# Patient Record
Sex: MALE | Race: WHITE | Employment: FULL TIME | URBAN - METROPOLITAN AREA
[De-identification: names, ages, dates, MRNs, and addresses within clinical notes are randomized per-mention and may not be internally consistent; named-entity substitution may affect disease eponyms.]

---

## 2017-01-26 ENCOUNTER — TELEPHONE (OUTPATIENT)
Dept: PAIN CLINIC | Facility: HOSPITAL | Age: 57
End: 2017-01-26

## 2017-03-23 RX ORDER — HYDROCODONE BITARTRATE AND ACETAMINOPHEN 10; 325 MG/1; MG/1
1 TABLET ORAL EVERY 6 HOURS PRN
Qty: 120 TABLET | Refills: 0 | Status: SHIPPED | OUTPATIENT
Start: 2017-03-23 | End: 2018-01-02

## 2017-07-20 ENCOUNTER — OFFICE VISIT (OUTPATIENT)
Dept: SURGERY | Facility: CLINIC | Age: 57
End: 2017-07-20

## 2017-07-20 DIAGNOSIS — D22.5 MELANOCYTIC NEVI OF TRUNK: Primary | ICD-10-CM

## 2017-07-20 RX ORDER — OLOPATADINE HCL 0.2 %
DROPS OPHTHALMIC (EYE)
COMMUNITY
Start: 2017-06-20 | End: 2020-12-30 | Stop reason: ALTCHOICE

## 2017-07-20 RX ORDER — KETOCONAZOLE 20 MG/G
CREAM TOPICAL
COMMUNITY
Start: 2017-07-18 | End: 2020-12-30

## 2017-07-20 NOTE — H&P
Ian Broderick. is a 64year old male that presents with Patient presents with:  Lesion: Multiple lesions R lower back, L chest superior and inferior, L medial chest, L posterior neck, back of head and L thigh  . REFERRED BY:  No ref.  provider found Back of head  Onset: many years  No pain  No itching  No bleeding  No infection  No size change and no color change.     Lesion 7   Location: L thigh  Onset: many years  No Pain  No itching  No bleeding  No infection   No size change or color change    No b Normal  HEENT: Normocephalic  EYES: Conjunctiva - Right: Normal, Left: Normal; EOMI  EARS: Inspection - Right: Normal, Left: Normal  NECK/THYROID: Inspection - Normal, Palpation - Normal, Thyroid gland - Normal, No adenopathy  RESPIRATORY: Inspection - Nor

## 2017-08-15 PROBLEM — S76.309A HAMSTRING INJURY: Status: ACTIVE | Noted: 2017-08-15

## 2017-09-11 ENCOUNTER — OFFICE VISIT (OUTPATIENT)
Dept: PAIN CLINIC | Facility: HOSPITAL | Age: 57
End: 2017-09-11
Attending: ANESTHESIOLOGY
Payer: COMMERCIAL

## 2017-09-11 VITALS
WEIGHT: 220 LBS | DIASTOLIC BLOOD PRESSURE: 95 MMHG | HEIGHT: 73 IN | BODY MASS INDEX: 29.16 KG/M2 | SYSTOLIC BLOOD PRESSURE: 145 MMHG | HEART RATE: 66 BPM

## 2017-09-11 DIAGNOSIS — S76.301D RIGHT HAMSTRING INJURY, SUBSEQUENT ENCOUNTER: Primary | ICD-10-CM

## 2017-09-11 DIAGNOSIS — M54.2 CERVICALGIA OF OCCIPITO-ATLANTO-AXIAL REGION: ICD-10-CM

## 2017-09-11 PROCEDURE — 99212 OFFICE O/P EST SF 10 MIN: CPT

## 2017-09-11 PROCEDURE — 20552 NJX 1/MLT TRIGGER POINT 1/2: CPT

## 2017-09-11 PROCEDURE — 20553 NJX 1/MLT TRIGGER POINTS 3/>: CPT

## 2017-09-11 RX ORDER — HYDROCODONE BITARTRATE AND ACETAMINOPHEN 10; 325 MG/1; MG/1
1 TABLET ORAL EVERY 6 HOURS PRN
Qty: 120 TABLET | Refills: 0 | Status: SHIPPED | OUTPATIENT
Start: 2017-09-11 | End: 2017-10-11

## 2017-09-11 RX ORDER — TRAMADOL HYDROCHLORIDE 50 MG/1
50 TABLET ORAL EVERY 6 HOURS PRN
Qty: 120 TABLET | Refills: 0 | Status: SHIPPED | OUTPATIENT
Start: 2017-09-11 | End: 2017-10-11

## 2017-09-11 RX ORDER — MELOXICAM 7.5 MG/1
7.5 TABLET ORAL DAILY
Qty: 30 TABLET | Refills: 0 | Status: SHIPPED | OUTPATIENT
Start: 2017-09-11 | End: 2017-10-11

## 2017-09-11 NOTE — PROGRESS NOTES
09/11/17  PRESENTS AMBULATORY TO CPM;  PT C/O OF RBP RADICULAR PAIN RLE;  RATES HIS PAIN 5/10;  SEEN BY DR. HERNANDEZ;  CONSENT FOR TPI REVIEWED & SIGNED;  TIME OUT TAKEN;  INJECTION COMPLETED;  PT TOLERATED PROCEDURE WITHOUT DIFFICULTY;  DISCHARGED IN STAB

## 2017-09-11 NOTE — CHRONIC PAIN
HPI:    Patient ID: Nae Gan is a 62year old male.     HPI    Review of Systems    I did some trigger points in Serjio Ortiz about 2 months ago in his right leg pain that decreased considerably he still has issues with a right hamstring weakness and and i clear all station heart rate and rhythm his infection course of motor is equal intact right hamstring is considerably weak compared to the to the left reflexes are patellar and ankle are 2+ and equal paraspinal muscles involving the cervical trapezial musc

## 2017-09-19 ENCOUNTER — TELEPHONE (OUTPATIENT)
Dept: PAIN CLINIC | Facility: HOSPITAL | Age: 57
End: 2017-09-19

## 2017-09-19 DIAGNOSIS — R29.898 WEAKNESS OF LOWER EXTREMITY, UNSPECIFIED LATERALITY: Primary | ICD-10-CM

## 2017-09-20 ENCOUNTER — TELEPHONE (OUTPATIENT)
Dept: PAIN CLINIC | Facility: HOSPITAL | Age: 57
End: 2017-09-20

## 2017-09-20 DIAGNOSIS — M79.604 RIGHT LEG PAIN: Primary | ICD-10-CM

## 2017-11-02 ENCOUNTER — TELEPHONE (OUTPATIENT)
Dept: NEUROLOGY | Facility: CLINIC | Age: 57
End: 2017-11-02

## 2017-11-03 ENCOUNTER — OFFICE VISIT (OUTPATIENT)
Dept: NEUROLOGY | Facility: CLINIC | Age: 57
End: 2017-11-03

## 2017-11-03 DIAGNOSIS — G62.9 NEUROPATHY: Primary | ICD-10-CM

## 2017-11-03 PROCEDURE — 95886 MUSC TEST DONE W/N TEST COMP: CPT | Performed by: OTHER

## 2017-11-03 PROCEDURE — 95908 NRV CNDJ TST 3-4 STUDIES: CPT | Performed by: OTHER

## 2017-12-30 ENCOUNTER — LAB ENCOUNTER (OUTPATIENT)
Dept: LAB | Facility: HOSPITAL | Age: 57
End: 2017-12-30
Attending: INTERNAL MEDICINE
Payer: COMMERCIAL

## 2017-12-30 DIAGNOSIS — Z00.00 EXAMINATION, MEDICAL, GENERAL: Primary | ICD-10-CM

## 2017-12-30 LAB
ALBUMIN SERPL BCP-MCNC: 4 G/DL (ref 3.5–4.8)
ALBUMIN/GLOB SERPL: 1.7 {RATIO} (ref 1–2)
ALP SERPL-CCNC: 56 U/L (ref 32–100)
ALT SERPL-CCNC: 39 U/L (ref 17–63)
ANION GAP SERPL CALC-SCNC: 7 MMOL/L (ref 0–18)
AST SERPL-CCNC: 35 U/L (ref 15–41)
BASOPHILS # BLD: 0.1 K/UL (ref 0–0.2)
BASOPHILS NFR BLD: 1 %
BILIRUB SERPL-MCNC: 0.6 MG/DL (ref 0.3–1.2)
BUN SERPL-MCNC: 11 MG/DL (ref 8–20)
BUN/CREAT SERPL: 7.7 (ref 10–20)
CALCIUM SERPL-MCNC: 9 MG/DL (ref 8.5–10.5)
CHLORIDE SERPL-SCNC: 105 MMOL/L (ref 95–110)
CHOLEST SERPL-MCNC: 206 MG/DL (ref 110–200)
CO2 SERPL-SCNC: 27 MMOL/L (ref 22–32)
CREAT SERPL-MCNC: 1.42 MG/DL (ref 0.5–1.5)
CREAT UR-MCNC: 165.3 MG/DL
EOSINOPHIL # BLD: 0.3 K/UL (ref 0–0.7)
EOSINOPHIL NFR BLD: 5 %
ERYTHROCYTE [DISTWIDTH] IN BLOOD BY AUTOMATED COUNT: 13.6 % (ref 11–15)
GLOBULIN PLAS-MCNC: 2.4 G/DL (ref 2.5–3.7)
GLUCOSE SERPL-MCNC: 104 MG/DL (ref 70–99)
HBA1C MFR BLD: 5.3 % (ref 4–6)
HCT VFR BLD AUTO: 52.4 % (ref 41–52)
HDLC SERPL-MCNC: 31 MG/DL
HGB BLD-MCNC: 17.6 G/DL (ref 13.5–17.5)
LDLC SERPL CALC-MCNC: 126 MG/DL (ref 0–99)
LYMPHOCYTES # BLD: 1.9 K/UL (ref 1–4)
LYMPHOCYTES NFR BLD: 27 %
MCH RBC QN AUTO: 30.7 PG (ref 27–32)
MCHC RBC AUTO-ENTMCNC: 33.5 G/DL (ref 32–37)
MCV RBC AUTO: 91.7 FL (ref 80–100)
MICROALBUMIN UR-MCNC: 0.4 MG/DL (ref 0–1.8)
MICROALBUMIN/CREAT UR: 2.4 MG/G{CREAT} (ref 0–20)
MONOCYTES # BLD: 0.6 K/UL (ref 0–1)
MONOCYTES NFR BLD: 9 %
NEUTROPHILS # BLD AUTO: 4.1 K/UL (ref 1.8–7.7)
NEUTROPHILS NFR BLD: 59 %
NONHDLC SERPL-MCNC: 175 MG/DL
OSMOLALITY UR CALC.SUM OF ELEC: 288 MOSM/KG (ref 275–295)
PLATELET # BLD AUTO: 171 K/UL (ref 140–400)
PMV BLD AUTO: 8.7 FL (ref 7.4–10.3)
POTASSIUM SERPL-SCNC: 4 MMOL/L (ref 3.3–5.1)
PROT SERPL-MCNC: 6.4 G/DL (ref 5.9–8.4)
PSA FREE MFR SERPL: 13 %
PSA FREE SERPL-MCNC: 0.61 NG/ML
PSA SERPL-MCNC: 4.6 NG/ML (ref 0–4)
RBC # BLD AUTO: 5.72 M/UL (ref 4.5–5.9)
SODIUM SERPL-SCNC: 139 MMOL/L (ref 136–144)
TRIGL SERPL-MCNC: 244 MG/DL (ref 1–149)
TSH SERPL-ACNC: 2.62 UIU/ML (ref 0.45–5.33)
WBC # BLD AUTO: 7.1 K/UL (ref 4–11)

## 2017-12-30 PROCEDURE — 86803 HEPATITIS C AB TEST: CPT

## 2017-12-30 PROCEDURE — 80053 COMPREHEN METABOLIC PANEL: CPT

## 2017-12-30 PROCEDURE — 82570 ASSAY OF URINE CREATININE: CPT

## 2017-12-30 PROCEDURE — 85025 COMPLETE CBC W/AUTO DIFF WBC: CPT

## 2017-12-30 PROCEDURE — 84443 ASSAY THYROID STIM HORMONE: CPT

## 2017-12-30 PROCEDURE — 82043 UR ALBUMIN QUANTITATIVE: CPT

## 2017-12-30 PROCEDURE — 82306 VITAMIN D 25 HYDROXY: CPT

## 2017-12-30 PROCEDURE — 84153 ASSAY OF PSA TOTAL: CPT

## 2017-12-30 PROCEDURE — 83036 HEMOGLOBIN GLYCOSYLATED A1C: CPT

## 2017-12-30 PROCEDURE — 84154 ASSAY OF PSA FREE: CPT

## 2017-12-30 PROCEDURE — 80061 LIPID PANEL: CPT

## 2017-12-30 PROCEDURE — 36415 COLL VENOUS BLD VENIPUNCTURE: CPT

## 2018-01-02 ENCOUNTER — TELEPHONE (OUTPATIENT)
Dept: PAIN CLINIC | Facility: HOSPITAL | Age: 58
End: 2018-01-02

## 2018-01-02 LAB — HCV AB SERPL QL IA: NONREACTIVE

## 2018-01-02 RX ORDER — HYDROCODONE BITARTRATE AND ACETAMINOPHEN 10; 325 MG/1; MG/1
1 TABLET ORAL EVERY 8 HOURS PRN
Qty: 90 TABLET | Refills: 0 | Status: SHIPPED | OUTPATIENT
Start: 2018-01-02 | End: 2018-02-21

## 2018-01-04 LAB — 25(OH)D3 SERPL-MCNC: 47.3 NG/ML

## 2018-02-21 ENCOUNTER — TELEPHONE (OUTPATIENT)
Dept: PAIN CLINIC | Facility: HOSPITAL | Age: 58
End: 2018-02-21

## 2018-02-21 RX ORDER — HYDROCODONE BITARTRATE AND ACETAMINOPHEN 10; 325 MG/1; MG/1
1 TABLET ORAL EVERY 8 HOURS PRN
Qty: 90 TABLET | Refills: 0 | Status: SHIPPED | OUTPATIENT
Start: 2018-02-21 | End: 2018-08-17

## 2018-03-29 ENCOUNTER — TELEPHONE (OUTPATIENT)
Dept: NEUROLOGY | Facility: CLINIC | Age: 58
End: 2018-03-29

## 2018-04-03 PROBLEM — I10 ESSENTIAL HYPERTENSION: Status: ACTIVE | Noted: 2018-04-03

## 2018-04-10 ENCOUNTER — LAB ENCOUNTER (OUTPATIENT)
Dept: LAB | Facility: HOSPITAL | Age: 58
End: 2018-04-10
Attending: INTERNAL MEDICINE
Payer: COMMERCIAL

## 2018-04-10 DIAGNOSIS — I10 ESSENTIAL HYPERTENSION: ICD-10-CM

## 2018-04-10 PROCEDURE — 81001 URINALYSIS AUTO W/SCOPE: CPT

## 2018-04-10 PROCEDURE — 80048 BASIC METABOLIC PNL TOTAL CA: CPT

## 2018-04-10 PROCEDURE — 36415 COLL VENOUS BLD VENIPUNCTURE: CPT

## 2018-04-26 ENCOUNTER — OFFICE VISIT (OUTPATIENT)
Dept: PAIN CLINIC | Facility: HOSPITAL | Age: 58
End: 2018-04-26
Attending: ANESTHESIOLOGY
Payer: COMMERCIAL

## 2018-04-26 VITALS
HEART RATE: 80 BPM | SYSTOLIC BLOOD PRESSURE: 136 MMHG | RESPIRATION RATE: 18 BRPM | DIASTOLIC BLOOD PRESSURE: 85 MMHG | HEIGHT: 74 IN | BODY MASS INDEX: 27.59 KG/M2 | WEIGHT: 215 LBS

## 2018-04-26 DIAGNOSIS — M50.30 DDD (DEGENERATIVE DISC DISEASE), CERVICAL: ICD-10-CM

## 2018-04-26 DIAGNOSIS — G89.29 CHRONIC LOW BACK PAIN WITH SCIATICA, SCIATICA LATERALITY UNSPECIFIED, UNSPECIFIED BACK PAIN LATERALITY: Primary | ICD-10-CM

## 2018-04-26 DIAGNOSIS — M79.604 RIGHT LEG PAIN: ICD-10-CM

## 2018-04-26 DIAGNOSIS — S76.301D RIGHT HAMSTRING INJURY, SUBSEQUENT ENCOUNTER: ICD-10-CM

## 2018-04-26 DIAGNOSIS — M54.40 CHRONIC LOW BACK PAIN WITH SCIATICA, SCIATICA LATERALITY UNSPECIFIED, UNSPECIFIED BACK PAIN LATERALITY: Primary | ICD-10-CM

## 2018-04-26 DIAGNOSIS — M54.2 CERVICALGIA OF OCCIPITO-ATLANTO-AXIAL REGION: ICD-10-CM

## 2018-04-26 PROCEDURE — 99213 OFFICE O/P EST LOW 20 MIN: CPT

## 2018-04-26 PROCEDURE — 20552 NJX 1/MLT TRIGGER POINT 1/2: CPT

## 2018-04-26 RX ORDER — HYDROCODONE BITARTRATE AND ACETAMINOPHEN 10; 325 MG/1; MG/1
1 TABLET ORAL EVERY 8 HOURS PRN
Qty: 90 TABLET | Refills: 0 | Status: SHIPPED | OUTPATIENT
Start: 2018-05-25 | End: 2018-06-24

## 2018-04-26 RX ORDER — HYDROCODONE BITARTRATE AND ACETAMINOPHEN 10; 325 MG/1; MG/1
1 TABLET ORAL EVERY 8 HOURS PRN
Qty: 90 TABLET | Refills: 0 | Status: SHIPPED | OUTPATIENT
Start: 2018-04-26 | End: 2018-05-26

## 2018-04-26 RX ORDER — DIAZEPAM 10 MG/1
10 TABLET ORAL NIGHTLY PRN
Qty: 30 TABLET | Refills: 2 | Status: SHIPPED | OUTPATIENT
Start: 2018-04-26 | End: 2018-05-26

## 2018-04-26 RX ORDER — BUPIVACAINE HYDROCHLORIDE 2.5 MG/ML
10 INJECTION, SOLUTION EPIDURAL; INFILTRATION; INTRACAUDAL ONCE
Status: COMPLETED | OUTPATIENT
Start: 2018-04-26 | End: 2018-04-26

## 2018-04-26 RX ORDER — METHYLPREDNISOLONE ACETATE 40 MG/ML
40 INJECTION, SUSPENSION INTRA-ARTICULAR; INTRALESIONAL; INTRAMUSCULAR; SOFT TISSUE ONCE
Status: COMPLETED | OUTPATIENT
Start: 2018-04-26 | End: 2018-04-26

## 2018-04-26 RX ADMIN — METHYLPREDNISOLONE ACETATE 40 MG: 40 INJECTION, SUSPENSION INTRA-ARTICULAR; INTRALESIONAL; INTRAMUSCULAR; SOFT TISSUE at 10:26:00

## 2018-04-26 RX ADMIN — BUPIVACAINE HYDROCHLORIDE 10 ML: 2.5 INJECTION, SOLUTION EPIDURAL; INFILTRATION; INTRACAUDAL at 10:25:00

## 2018-04-26 NOTE — PROGRESS NOTES
04/ 26/18      PRESENTS AMBULATORY TO CPM;  MEDICAL MANAGEMENT FOR CHRONIC    C/O OF RBP RADICULAR PAIN RLE;  RATES HIS PAIN 6/10;  REQUESTING TPI TODAY;   SEEN BY DR. HERNANDEZ;  CONSENT FOR TPI REVIEWED & SIGNED;  TIME OUT TAKEN;  INJECTION COMPLETED;  PT

## 2018-04-26 NOTE — CHRONIC PAIN
Follow-up Note    HISTORY OF PRESENT ILLNESS:  Joni Laws is a 62year old old male, returns to the clinic for evaluation treatment of his low back thoracic pain joint pain and right hamstring issues overall Charisse Gamboa is doing pretty well he is improved Oral Tab Take 1 tablet (5 mg total) by mouth daily. Disp: 90 tablet Rfl: 3   HYDROcodone-acetaminophen  MG Oral Tab Take 1 tablet by mouth every 8 (eight) hours as needed for Pain.  Disp: 90 tablet Rfl: 0   ketoconazole 2 % External Cream  Disp:  Rfl: Ruptured hamstring tendon 2015   • Spastic bladder        SURGICAL HISTORY:  Past Surgical History:  No date: CATARACT SURGERY, COMPLEX Bilateral    FAMILY HISTORY:  History reviewed. No pertinent family history.     SOCIAL HISTORY:    Social History  Socia muscle spasm  Myositis involving the thoracic lumbar region  Limited use of Norco for pain control  Sleep disturbance secondary to muscle spasm       PLAN:  RECOMMENDATIONS:  Trigger point injection mid thoracic   Renew norco 3/day for pain control     Tri

## 2018-05-25 RX ORDER — DIAZEPAM 10 MG/1
10 TABLET ORAL NIGHTLY PRN
Qty: 30 TABLET | Refills: 0 | OUTPATIENT
Start: 2018-05-25 | End: 2018-06-13

## 2018-06-13 ENCOUNTER — TELEPHONE (OUTPATIENT)
Dept: PAIN CLINIC | Facility: HOSPITAL | Age: 58
End: 2018-06-13

## 2018-06-13 RX ORDER — DIAZEPAM 10 MG/1
10 TABLET ORAL NIGHTLY PRN
Qty: 30 TABLET | Refills: 2 | Status: SHIPPED | OUTPATIENT
Start: 2018-06-13 | End: 2018-07-13

## 2018-06-13 RX ORDER — HYDROCODONE BITARTRATE AND ACETAMINOPHEN 10; 325 MG/1; MG/1
1 TABLET ORAL EVERY 6 HOURS PRN
Qty: 90 TABLET | Refills: 0 | Status: SHIPPED | OUTPATIENT
Start: 2018-07-13 | End: 2018-08-12

## 2018-06-13 RX ORDER — HYDROCODONE BITARTRATE AND ACETAMINOPHEN 10; 325 MG/1; MG/1
1 TABLET ORAL EVERY 6 HOURS PRN
Qty: 90 TABLET | Refills: 0 | Status: SHIPPED | OUTPATIENT
Start: 2018-06-13 | End: 2018-06-13

## 2018-07-15 ENCOUNTER — APPOINTMENT (OUTPATIENT)
Dept: GENERAL RADIOLOGY | Age: 58
End: 2018-07-15
Attending: EMERGENCY MEDICINE
Payer: COMMERCIAL

## 2018-07-15 ENCOUNTER — HOSPITAL ENCOUNTER (OUTPATIENT)
Age: 58
Discharge: HOME | End: 2018-07-15
Attending: EMERGENCY MEDICINE
Payer: COMMERCIAL

## 2018-07-15 VITALS
OXYGEN SATURATION: 97 % | BODY MASS INDEX: 33.74 KG/M2 | HEART RATE: 66 BPM | TEMPERATURE: 98 F | DIASTOLIC BLOOD PRESSURE: 90 MMHG | WEIGHT: 215 LBS | SYSTOLIC BLOOD PRESSURE: 140 MMHG | RESPIRATION RATE: 18 BRPM | HEIGHT: 67 IN

## 2018-07-15 DIAGNOSIS — S61.214A LACERATION OF RIGHT RING FINGER WITHOUT FOREIGN BODY WITHOUT DAMAGE TO NAIL, INITIAL ENCOUNTER: Primary | ICD-10-CM

## 2018-07-15 PROCEDURE — 73130 X-RAY EXAM OF HAND: CPT | Performed by: EMERGENCY MEDICINE

## 2018-07-15 PROCEDURE — 99213 OFFICE O/P EST LOW 20 MIN: CPT

## 2018-07-15 PROCEDURE — 90471 IMMUNIZATION ADMIN: CPT

## 2018-07-15 PROCEDURE — 99214 OFFICE O/P EST MOD 30 MIN: CPT

## 2018-07-15 PROCEDURE — 12002 RPR S/N/AX/GEN/TRNK2.6-7.5CM: CPT

## 2018-07-15 RX ORDER — ONDANSETRON 4 MG/1
4 TABLET, ORALLY DISINTEGRATING ORAL ONCE
Status: COMPLETED | OUTPATIENT
Start: 2018-07-15 | End: 2018-07-15

## 2018-07-15 RX ORDER — CEFADROXIL 500 MG/1
500 CAPSULE ORAL 2 TIMES DAILY
Qty: 10 CAPSULE | Refills: 0 | Status: SHIPPED | OUTPATIENT
Start: 2018-07-15 | End: 2018-07-20

## 2018-07-15 NOTE — ED NOTES
Patient has a laceration to his right 4th finger after the skin of his finger got caught in a garage door. Sravani Bowie at bedside for suturing.

## 2018-07-15 NOTE — ED PROVIDER NOTES
Patient Seen in: [address]    History   Patient presents with:  Laceration Abrasion (integumentary)    Stated Complaint: finger laceration    HPI    Patient states he sustained a laceration to his right ring finger getti laceration or exposed bone or foreign body noted. There is no tenderness to the middle or proximal phalanx. There is tenderness on palpation of the distal fingers of the index  third finger or long fingers.   The patient has intact capillary refill of the recheck and further care.   We will place the patient on a 5 day course of prophylactic antibiotic        Disposition and Plan     Clinical Impression:  Laceration of right ring finger without foreign body without damage to nail, initial encounter  (primary

## 2018-07-15 NOTE — ED NOTES
Wound cleansed and dressed by Emanuel Blair. Patient verbalized understanding if all discharge instruction. Patient is stable at time of discharge.

## 2018-07-30 ENCOUNTER — OFFICE VISIT (OUTPATIENT)
Dept: SURGERY | Facility: CLINIC | Age: 58
End: 2018-07-30
Payer: COMMERCIAL

## 2018-07-30 DIAGNOSIS — S61.204A UNSPECIFIED OPEN WOUND OF RIGHT RING FINGER WITHOUT DAMAGE TO NAIL, INITIAL ENCOUNTER: Primary | ICD-10-CM

## 2018-07-30 NOTE — H&P
Jeovanny Atwood. is a 62year old male that presents with Patient presents with:  Laceration: RRF  . REFERRED BY:  No ref.  provider found    Pacemaker: No  Latex Allergy: no  Coumadin: No  Plavix: No  Other anticoagulants: No  Cardiac stents: No    MOJICA PATADAY 0.2 % Ophthalmic Solution  Disp:  Rfl:    Pantoprazole Sodium 40 MG Oral Tab EC Take 40 mg by mouth every morning before breakfast. Disp:  Rfl:    Nutritional Supplements (JUICE PLUS FIBRE OR) Take by mouth.  Disp:  Rfl:    Cyanocobalamin (B-12) 5 sutured. Flap viable    ASSESSMENT/PLAN:     R RF avulsion flap, sutured    Sutures out    Start Eucerin moisturizers and massage  \"After Skin Surgery\" pamphlet dispensed. Discharged. To call if any problems or concerns.           7/30/2018  Faiza Merritt

## 2018-07-30 NOTE — PROGRESS NOTES
Per Dr Alexandria Smart sutures removed without difficulty. Pt tolerated well. Per verbal order from Dr Alexandria Smart, pt  given verbal instructions with demonstration of Eucerin massage to incision of RIF. Given \"After Skin Surgery\" Pamphlet.   Discussed sun e

## 2018-08-07 PROBLEM — M25.511 ACUTE PAIN OF RIGHT SHOULDER: Status: ACTIVE | Noted: 2018-08-07

## 2018-08-07 PROBLEM — M75.42 IMPINGEMENT SYNDROME OF LEFT SHOULDER: Status: ACTIVE | Noted: 2018-08-07

## 2018-08-07 PROBLEM — M25.512 ACUTE PAIN OF LEFT SHOULDER: Status: ACTIVE | Noted: 2018-08-07

## 2018-08-17 RX ORDER — HYDROCODONE BITARTRATE AND ACETAMINOPHEN 10; 325 MG/1; MG/1
1 TABLET ORAL EVERY 8 HOURS PRN
Qty: 90 TABLET | Refills: 0 | Status: SHIPPED | OUTPATIENT
Start: 2018-08-17 | End: 2018-09-26

## 2018-09-26 RX ORDER — HYDROCODONE BITARTRATE AND ACETAMINOPHEN 10; 325 MG/1; MG/1
1 TABLET ORAL EVERY 8 HOURS PRN
Qty: 90 TABLET | Refills: 0 | Status: SHIPPED | OUTPATIENT
Start: 2018-10-26 | End: 2018-11-25

## 2018-09-26 RX ORDER — HYDROCODONE BITARTRATE AND ACETAMINOPHEN 10; 325 MG/1; MG/1
1 TABLET ORAL EVERY 8 HOURS PRN
Qty: 90 TABLET | Refills: 0 | Status: SHIPPED | OUTPATIENT
Start: 2018-09-26 | End: 2018-10-26

## 2018-10-05 ENCOUNTER — OFFICE VISIT (OUTPATIENT)
Dept: PAIN CLINIC | Facility: HOSPITAL | Age: 58
End: 2018-10-05
Attending: ANESTHESIOLOGY
Payer: COMMERCIAL

## 2018-10-05 VITALS
HEIGHT: 74 IN | DIASTOLIC BLOOD PRESSURE: 86 MMHG | RESPIRATION RATE: 18 BRPM | BODY MASS INDEX: 27.59 KG/M2 | SYSTOLIC BLOOD PRESSURE: 128 MMHG | HEART RATE: 58 BPM | WEIGHT: 215 LBS | OXYGEN SATURATION: 97 %

## 2018-10-05 DIAGNOSIS — M79.601 ARM PAIN, MUSCULOSKELETAL, RIGHT: Primary | ICD-10-CM

## 2018-10-05 PROCEDURE — 99213 OFFICE O/P EST LOW 20 MIN: CPT

## 2018-10-05 PROCEDURE — 20552 NJX 1/MLT TRIGGER POINT 1/2: CPT

## 2018-10-05 PROCEDURE — 3E0233Z INTRODUCTION OF ANTI-INFLAMMATORY INTO MUSCLE, PERCUTANEOUS APPROACH: ICD-10-PCS | Performed by: ANESTHESIOLOGY

## 2018-10-05 PROCEDURE — 3E023BZ INTRODUCTION OF ANESTHETIC AGENT INTO MUSCLE, PERCUTANEOUS APPROACH: ICD-10-PCS | Performed by: ANESTHESIOLOGY

## 2018-10-05 RX ORDER — HYDROCODONE BITARTRATE AND ACETAMINOPHEN 10; 325 MG/1; MG/1
1 TABLET ORAL EVERY 8 HOURS PRN
Qty: 90 TABLET | Refills: 0 | Status: CANCELLED | OUTPATIENT
Start: 2018-10-26 | End: 2018-11-25

## 2018-10-05 RX ORDER — METHYLPREDNISOLONE 4 MG/1
TABLET ORAL
Qty: 21 TABLET | Refills: 0 | Status: SHIPPED | OUTPATIENT
Start: 2018-10-05 | End: 2020-05-29

## 2018-10-05 RX ORDER — CYCLOBENZAPRINE HCL 10 MG
10 TABLET ORAL 3 TIMES DAILY PRN
Qty: 90 TABLET | Refills: 1 | Status: SHIPPED | OUTPATIENT
Start: 2018-10-05 | End: 2018-11-04

## 2018-10-05 RX ORDER — HYDROCODONE BITARTRATE AND ACETAMINOPHEN 10; 325 MG/1; MG/1
1 TABLET ORAL EVERY 8 HOURS PRN
Qty: 90 TABLET | Refills: 0 | Status: CANCELLED | OUTPATIENT
Start: 2018-11-25 | End: 2018-12-25

## 2018-10-05 NOTE — CHRONIC PAIN
Katey Logan is well-known to the Center for pain management with acute and chronic pain issues due to degenerative changes of his spine and joints. He receives periodic injection therapy for exacerbation of his chronic pain.   He was doing well until last Flexeril muscle relaxant for follow-up care.

## 2018-10-05 NOTE — PROGRESS NOTES
10/05/18      PRESENTS AMBULATORY TO CPM;   PT C/O RT SHLDR JESUS;  HAS HX TORN ROTATOR CUFF AND INJURY TO THE SHLDR;  PT UNABLE TO STOP WORKING FOR SURGERY;  HAS BEEN GETTING  INJECTIONS TO THE SHLDR;  RATES HIS PAIN 10/10;  PT HAS BEEN WORKING ON HIS HOME-

## 2018-10-09 PROBLEM — S46.111A LABRAL TEAR OF LONG HEAD OF BICEPS TENDON, RIGHT, INITIAL ENCOUNTER: Status: ACTIVE | Noted: 2018-10-09

## 2018-10-15 ENCOUNTER — HOSPITAL ENCOUNTER (OUTPATIENT)
Dept: ULTRASOUND IMAGING | Facility: HOSPITAL | Age: 58
Discharge: HOME | End: 2018-10-15
Attending: ORTHOPAEDIC SURGERY
Payer: COMMERCIAL

## 2018-10-15 DIAGNOSIS — M79.661 RIGHT CALF PAIN: ICD-10-CM

## 2018-10-15 DIAGNOSIS — M79.89 CALF SWELLING: ICD-10-CM

## 2018-10-15 PROCEDURE — 93971 EXTREMITY STUDY: CPT | Performed by: ORTHOPAEDIC SURGERY

## 2018-11-15 PROBLEM — M19.012 ARTHRITIS OF LEFT SHOULDER REGION: Status: ACTIVE | Noted: 2018-11-15

## 2018-11-15 PROBLEM — M79.672 LEFT FOOT PAIN: Status: ACTIVE | Noted: 2018-11-15

## 2018-11-29 PROBLEM — G89.29 CHRONIC PAIN IN RIGHT SHOULDER: Status: ACTIVE | Noted: 2018-11-29

## 2018-11-29 PROBLEM — M75.20 TENDINITIS OF LONG HEAD OF BICEPS: Status: ACTIVE | Noted: 2018-11-29

## 2018-11-29 PROBLEM — M25.511 CHRONIC PAIN IN RIGHT SHOULDER: Status: ACTIVE | Noted: 2018-11-29

## 2018-11-29 PROBLEM — M75.112 PARTIAL NONTRAUMATIC TEAR OF ROTATOR CUFF, LEFT: Status: ACTIVE | Noted: 2018-11-29

## 2019-01-29 ENCOUNTER — OFFICE VISIT (OUTPATIENT)
Dept: PAIN CLINIC | Facility: HOSPITAL | Age: 59
End: 2019-01-29
Attending: ANESTHESIOLOGY
Payer: COMMERCIAL

## 2019-01-29 VITALS — BODY MASS INDEX: 27.59 KG/M2 | HEIGHT: 74 IN | WEIGHT: 215 LBS

## 2019-01-29 DIAGNOSIS — M25.511 CHRONIC PAIN OF BOTH SHOULDERS: Primary | ICD-10-CM

## 2019-01-29 DIAGNOSIS — M50.30 DDD (DEGENERATIVE DISC DISEASE), CERVICAL: ICD-10-CM

## 2019-01-29 DIAGNOSIS — S76.301D RIGHT HAMSTRING INJURY, SUBSEQUENT ENCOUNTER: ICD-10-CM

## 2019-01-29 DIAGNOSIS — G89.29 CHRONIC PAIN OF BOTH SHOULDERS: Primary | ICD-10-CM

## 2019-01-29 DIAGNOSIS — M25.512 CHRONIC PAIN OF BOTH SHOULDERS: Primary | ICD-10-CM

## 2019-01-29 PROCEDURE — 99213 OFFICE O/P EST LOW 20 MIN: CPT

## 2019-01-29 PROCEDURE — 20552 NJX 1/MLT TRIGGER POINT 1/2: CPT

## 2019-01-29 RX ORDER — HYDROCODONE BITARTRATE AND ACETAMINOPHEN 10; 325 MG/1; MG/1
1 TABLET ORAL EVERY 6 HOURS PRN
Qty: 120 TABLET | Refills: 0 | Status: SHIPPED | OUTPATIENT
Start: 2019-01-29 | End: 2019-02-28

## 2019-01-29 RX ORDER — CYCLOBENZAPRINE HCL 10 MG
10 TABLET ORAL 3 TIMES DAILY PRN
Qty: 30 TABLET | Refills: 2 | Status: SHIPPED | OUTPATIENT
Start: 2019-01-29 | End: 2019-02-28

## 2019-01-29 RX ORDER — METHYLPREDNISOLONE ACETATE 40 MG/ML
80 INJECTION, SUSPENSION INTRA-ARTICULAR; INTRALESIONAL; INTRAMUSCULAR; SOFT TISSUE ONCE
Status: DISCONTINUED | OUTPATIENT
Start: 2019-01-29 | End: 2019-04-25

## 2019-01-29 RX ORDER — HYDROCODONE BITARTRATE AND ACETAMINOPHEN 10; 325 MG/1; MG/1
1 TABLET ORAL EVERY 6 HOURS PRN
Qty: 120 TABLET | Refills: 0 | Status: SHIPPED | OUTPATIENT
Start: 2019-02-28 | End: 2019-03-30

## 2019-01-29 RX ORDER — BUPIVACAINE HYDROCHLORIDE 2.5 MG/ML
10 INJECTION, SOLUTION EPIDURAL; INFILTRATION; INTRACAUDAL ONCE
Status: DISCONTINUED | OUTPATIENT
Start: 2019-01-29 | End: 2019-04-25

## 2019-01-29 NOTE — CHRONIC PAIN
Initial Consultation Note      HISTORY OF PRESENT ILLNESS:  Keaganjag Josefa. is a 62year old old male referred to the pain clinic  for today evaluation for his bilateral shoulder pain fortunately Bobbyhermila Ramiro has bilateral rotator cuff tears and has been recomme Rfl:    B Complex-C-E-Zn (TRACI ZINC) Oral Tab Take by mouth. Disp:  Rfl:    vitamin A 23764 UNITS Oral Cap Take 10,000 Units by mouth daily. Disp:  Rfl:    Acidophilus/Pectin Oral Cap Take 1 capsule by mouth daily.  Disp:  Rfl:         ALLERGIES:  No Known on file    Occupational History      Not on file    Tobacco Use      Smoking status: Never Smoker      Smokeless tobacco: Never Used    Substance and Sexual Activity      Alcohol use:  Yes        Alcohol/week: 2.4 - 3.6 oz        Types: 4 - 6 Standard drink tendon reflexes: Normal   Babinski Reflex: absent bilaterally   Temperature:  normal to touch bilateral upper and lower extremities  Edema - Absent  *    Sensation (light touch/pinprick/temperature):     Right Lower Extremity:  Normal  Left Lower Extremity patient information and discussing the care with the patients health care providers.      Procedure note   bilateral shoulder joint injections   indication bilateral rotator cuff tears bilateral shoulder pain   all the risk and benefits of the procedure dis

## 2019-01-29 NOTE — PROGRESS NOTES
01/29/19      PRESENTS AMBULATORY TO CPM;   PT C/O BILATERAL  SHLDR PAN;  HAS HX TORN ROTATOR CUFF AND INJURY TO THE SHLDRS ;  PT UNABLE TO STOP WORKING FOR SURGERY;  HAS BEEN GETTING  INJECTIONS TO THE SHLDR;  RATES HIS PAIN 10/10;  USES ICE TO HELP THE P

## 2019-02-28 ENCOUNTER — OFFICE VISIT (OUTPATIENT)
Dept: PAIN CLINIC | Facility: HOSPITAL | Age: 59
End: 2019-02-28
Attending: ANESTHESIOLOGY
Payer: COMMERCIAL

## 2019-02-28 VITALS — BODY MASS INDEX: 27.59 KG/M2 | HEIGHT: 74 IN | RESPIRATION RATE: 18 BRPM | WEIGHT: 215 LBS

## 2019-02-28 DIAGNOSIS — G89.29 CHRONIC PAIN OF BOTH SHOULDERS: Primary | ICD-10-CM

## 2019-02-28 DIAGNOSIS — M25.512 CHRONIC PAIN OF BOTH SHOULDERS: Primary | ICD-10-CM

## 2019-02-28 DIAGNOSIS — M25.511 CHRONIC PAIN OF BOTH SHOULDERS: Primary | ICD-10-CM

## 2019-02-28 PROCEDURE — 20605 DRAIN/INJ JOINT/BURSA W/O US: CPT

## 2019-02-28 PROCEDURE — 99213 OFFICE O/P EST LOW 20 MIN: CPT

## 2019-02-28 RX ORDER — CYCLOBENZAPRINE HCL 10 MG
10 TABLET ORAL 3 TIMES DAILY PRN
Qty: 90 TABLET | Refills: 3 | Status: SHIPPED | OUTPATIENT
Start: 2019-02-28 | End: 2019-03-30

## 2019-02-28 RX ORDER — MELOXICAM 15 MG/1
15 TABLET ORAL DAILY
Qty: 30 TABLET | Refills: 2 | Status: SHIPPED | OUTPATIENT
Start: 2019-02-28 | End: 2019-03-30

## 2019-02-28 RX ORDER — METHYLPREDNISOLONE ACETATE 40 MG/ML
80 INJECTION, SUSPENSION INTRA-ARTICULAR; INTRALESIONAL; INTRAMUSCULAR; SOFT TISSUE ONCE
Status: DISCONTINUED | OUTPATIENT
Start: 2019-02-28 | End: 2019-04-25

## 2019-02-28 RX ORDER — PREDNISONE 1 MG/1
5 TABLET ORAL DAILY
Qty: 30 TABLET | Refills: 2 | Status: SHIPPED | OUTPATIENT
Start: 2019-02-28 | End: 2020-12-30 | Stop reason: ALTCHOICE

## 2019-02-28 RX ORDER — LIDOCAINE HYDROCHLORIDE 10 MG/ML
10 INJECTION, SOLUTION EPIDURAL; INFILTRATION; INTRACAUDAL; PERINEURAL ONCE
Status: DISCONTINUED | OUTPATIENT
Start: 2019-02-28 | End: 2019-04-25

## 2019-02-28 RX ORDER — TRAMADOL HYDROCHLORIDE 50 MG/1
50 TABLET ORAL EVERY 6 HOURS PRN
Qty: 30 TABLET | Refills: 0 | Status: SHIPPED | OUTPATIENT
Start: 2019-02-28 | End: 2021-06-23

## 2019-02-28 RX ORDER — BUPIVACAINE HYDROCHLORIDE 2.5 MG/ML
10 INJECTION, SOLUTION EPIDURAL; INFILTRATION; INTRACAUDAL ONCE
Status: DISCONTINUED | OUTPATIENT
Start: 2019-02-28 | End: 2019-04-25

## 2019-02-28 NOTE — CHRONIC PAIN
Initial Consultation Note      HISTORY OF PRESENT ILLNESS:  Jerline Angelucci. is a 62year old old male referred to the pain clinic  for today evaluation for his bilateral shoulder pain fortunately Clif Mccray has bilateral rotator cuff tears and has been recomme 40 MG Oral Tab EC Take 40 mg by mouth every morning before breakfast. Disp:  Rfl:    Nutritional Supplements (JUICE PLUS FIBRE OR) Take by mouth. Disp:  Rfl:    Cyanocobalamin (B-12) 500 MCG Oral Tab Take by mouth.  Disp:  Rfl:    Cholecalciferol (VITAMIN D education: Not on file      Highest education level: Not on file    Social Needs      Financial resource strain: Not on file      Food insecurity - worry: Not on file      Food insecurity - inability: Not on file      Transportation needs - medical: Not on 5/5 5/5   Gastrocnemius 5/5 5/5     PULSES      LEFT RIGHT   Radial 2/4 2/4   Dorsalis Pedis 2/4 2/4   Posterior Tibial 2/4 2/4   Brachial 2/4 2/4     SLR: negative  SIJ tenderness negative  Joint Exam: Normal  TPs:  Present within lumbo-sacral paraspinal the anterior approach was used below the acromion on the left shoulder the posterior approach was used sterilely dominant with each injection negative aspirations prior to the injection of the medication total of 6 cc of 25% Marcaine as well as 40 mg of De

## 2019-02-28 NOTE — PROGRESS NOTES
02/28/19      PRESENTS AMBULATORY TO CPM;   PT C/O BILATERAL  SHLDR PAN;  HAS HX TORN ROTATOR CUFF AND INJURY TO THE SHLDRS ;  PT UNABLE TO STOP WORKING FOR SURGERY;  HAS BEEN GETTING  INJECTIONS TO THE SHLDR;  RATES HIS PAIN 9/10;  USES ICE TO HELP THE PA

## 2019-04-25 ENCOUNTER — OFFICE VISIT (OUTPATIENT)
Dept: PAIN CLINIC | Facility: HOSPITAL | Age: 59
End: 2019-04-25
Attending: ANESTHESIOLOGY
Payer: COMMERCIAL

## 2019-04-25 VITALS
BODY MASS INDEX: 27.59 KG/M2 | WEIGHT: 215 LBS | RESPIRATION RATE: 18 BRPM | SYSTOLIC BLOOD PRESSURE: 132 MMHG | HEIGHT: 74 IN | DIASTOLIC BLOOD PRESSURE: 78 MMHG

## 2019-04-25 DIAGNOSIS — M25.511 CHRONIC PAIN OF BOTH SHOULDERS: Primary | ICD-10-CM

## 2019-04-25 DIAGNOSIS — G89.29 CHRONIC PAIN OF BOTH SHOULDERS: Primary | ICD-10-CM

## 2019-04-25 DIAGNOSIS — S76.301D RIGHT HAMSTRING INJURY, SUBSEQUENT ENCOUNTER: ICD-10-CM

## 2019-04-25 DIAGNOSIS — M25.512 CHRONIC PAIN OF BOTH SHOULDERS: Primary | ICD-10-CM

## 2019-04-25 DIAGNOSIS — M54.2 CERVICALGIA OF OCCIPITO-ATLANTO-AXIAL REGION: ICD-10-CM

## 2019-04-25 PROCEDURE — 20552 NJX 1/MLT TRIGGER POINT 1/2: CPT

## 2019-04-25 PROCEDURE — 99213 OFFICE O/P EST LOW 20 MIN: CPT

## 2019-04-25 RX ORDER — HYDROCODONE BITARTRATE AND ACETAMINOPHEN 10; 325 MG/1; MG/1
1 TABLET ORAL EVERY 6 HOURS PRN
COMMUNITY
End: 2020-02-14

## 2019-04-25 RX ORDER — DIAZEPAM 10 MG/1
10 TABLET ORAL NIGHTLY PRN
Qty: 30 TABLET | Refills: 2 | Status: SHIPPED | OUTPATIENT
Start: 2019-04-25 | End: 2019-05-25

## 2019-04-25 RX ORDER — HYDROCODONE BITARTRATE AND ACETAMINOPHEN 10; 325 MG/1; MG/1
1 TABLET ORAL EVERY 6 HOURS PRN
Qty: 120 TABLET | Refills: 0 | Status: SHIPPED | OUTPATIENT
Start: 2019-05-25 | End: 2019-06-24

## 2019-04-25 RX ORDER — HYDROCODONE BITARTRATE AND ACETAMINOPHEN 10; 325 MG/1; MG/1
1 TABLET ORAL EVERY 6 HOURS PRN
Qty: 120 TABLET | Refills: 0 | Status: SHIPPED | OUTPATIENT
Start: 2019-04-25 | End: 2019-05-25

## 2019-04-25 RX ORDER — BUPIVACAINE HYDROCHLORIDE 2.5 MG/ML
10 INJECTION, SOLUTION EPIDURAL; INFILTRATION; INTRACAUDAL ONCE
Status: DISCONTINUED | OUTPATIENT
Start: 2019-04-25 | End: 2019-04-25

## 2019-04-25 RX ORDER — METHYLPREDNISOLONE ACETATE 40 MG/ML
80 INJECTION, SUSPENSION INTRA-ARTICULAR; INTRALESIONAL; INTRAMUSCULAR; SOFT TISSUE ONCE
Status: DISCONTINUED | OUTPATIENT
Start: 2019-04-25 | End: 2019-04-25

## 2019-04-25 RX ORDER — METHYLPREDNISOLONE 4 MG/1
4 TABLET ORAL ONCE
Qty: 1 PACKAGE | Refills: 0 | Status: SHIPPED | OUTPATIENT
Start: 2019-04-25 | End: 2019-04-25

## 2019-04-25 NOTE — PROGRESS NOTES
04/25/19       PRESENTS AMBULATORY TO CPM;   PT C/O BILATERAL  SHLDR PAN;  HAS HX TORN ROTATOR CUFF AND INJURY TO THE SHLDRS ;  PT REPORTS HE WILL BE GETTING SHOULDER SURGERY;  \"I HAVE TO BITE THE BULLET,  I CAN'T CONTINUE THIS WAY\";  RATES HIS PAIN 9/10

## 2019-04-25 NOTE — CHRONIC PAIN
Initial Consultation Note      HISTORY OF PRESENT ILLNESS:  Funmilayo Buchanan. is a 62year old old male referred to the pain clinic  for today evaluation for his bilateral shoulder pain fortunately Sarah Henley has bilateral rotator cuff tears and has been recomme total) by mouth daily. Disp: 30 tablet Rfl: 2   traMADol HCl 50 MG Oral Tab Take 1 tablet (50 mg total) by mouth every 6 (six) hours as needed for Pain.  Disp: 30 tablet Rfl: 0   METOPROLOL TARTRATE 25 MG Oral Tab TAKE 1 TABLET BY MOUTH TWICE DAILY Disp: 60 head of biceps     Chronic pain in right shoulder    Past Medical History:   Diagnosis Date   • Essential hypertension    • GERD (gastroesophageal reflux disease)    • High blood pressure    • Kidney stone    • Ruptured hamstring tendon 2015   • Spastic bl Asked        History of tanning: No        Hx of Spending [address] of Time in Sun: No        Bad sunburns in the past: No        Tanning Salons in the Past: No        Hx of Radiation Treatments: No        Regular use of sun block: Not Asked    Social His MCV 91.7 12/30/2017    MCH 30.7 12/30/2017    MCHC 33.5 12/30/2017    RDW 13.6 12/30/2017     12/30/2017    MPV 8.7 12/30/2017     Lab Results   Component Value Date     04/10/2018    K 4.1 04/10/2018     04/10/2018    CO2 28 04/10/2018 the right shoulder the anterior approach was used below the acromion on the left shoulder the posterior approach was used sterilely dominant with each injection negative aspirations prior to the injection of the medication total of 6 cc of 25% Marcaine as

## 2019-06-19 ENCOUNTER — TELEPHONE (OUTPATIENT)
Dept: PAIN CLINIC | Facility: HOSPITAL | Age: 59
End: 2019-06-19

## 2019-06-19 RX ORDER — HYDROCODONE BITARTRATE AND ACETAMINOPHEN 10; 325 MG/1; MG/1
1 TABLET ORAL EVERY 6 HOURS PRN
Qty: 30 TABLET | Refills: 0 | Status: SHIPPED | OUTPATIENT
Start: 2019-07-20 | End: 2019-08-19

## 2019-06-19 RX ORDER — HYDROCODONE BITARTRATE AND ACETAMINOPHEN 10; 325 MG/1; MG/1
1 TABLET ORAL EVERY 6 HOURS PRN
Qty: 120 TABLET | Refills: 0 | Status: SHIPPED | OUTPATIENT
Start: 2019-06-19 | End: 2019-07-19

## 2019-06-19 RX ORDER — HYDROCODONE BITARTRATE AND ACETAMINOPHEN 10; 325 MG/1; MG/1
1 TABLET ORAL EVERY 6 HOURS PRN
Qty: 120 TABLET | Refills: 0 | Status: SHIPPED | OUTPATIENT
Start: 2019-07-19 | End: 2019-08-18

## 2019-06-19 RX ORDER — NALOXONE HYDROCHLORIDE 4 MG/.1ML
4 SPRAY, METERED NASAL AS NEEDED
Qty: 1 KIT | Refills: 0 | Status: SHIPPED | OUTPATIENT
Start: 2019-06-19 | End: 2020-12-30 | Stop reason: ALTCHOICE

## 2019-06-19 NOTE — TELEPHONE ENCOUNTER
Renew script for dr Joey Larkin  Rt shoulder pain worseneing   Has appt with wilson at [facility] for possible surgery

## 2019-06-21 ENCOUNTER — APPOINTMENT (OUTPATIENT)
Dept: LAB | Facility: HOSPITAL | Age: 59
End: 2019-06-21
Attending: INTERNAL MEDICINE
Payer: COMMERCIAL

## 2019-06-21 DIAGNOSIS — I10 ESSENTIAL HYPERTENSION, MALIGNANT: Primary | ICD-10-CM

## 2019-06-21 PROCEDURE — 80048 BASIC METABOLIC PNL TOTAL CA: CPT

## 2019-06-21 PROCEDURE — 85025 COMPLETE CBC W/AUTO DIFF WBC: CPT

## 2019-06-21 PROCEDURE — 93005 ELECTROCARDIOGRAM TRACING: CPT

## 2019-06-21 PROCEDURE — 36415 COLL VENOUS BLD VENIPUNCTURE: CPT

## 2019-06-21 PROCEDURE — 93010 ELECTROCARDIOGRAM REPORT: CPT | Performed by: INTERNAL MEDICINE

## 2019-08-19 ENCOUNTER — TELEPHONE (OUTPATIENT)
Dept: PAIN CLINIC | Facility: HOSPITAL | Age: 59
End: 2019-08-19

## 2019-08-19 RX ORDER — HYDROCODONE BITARTRATE AND ACETAMINOPHEN 10; 325 MG/1; MG/1
1 TABLET ORAL EVERY 8 HOURS PRN
Qty: 90 TABLET | Refills: 0 | Status: SHIPPED | OUTPATIENT
Start: 2019-08-19 | End: 2019-09-18

## 2019-08-23 ENCOUNTER — LAB REQUISITION (OUTPATIENT)
Dept: LAB | Facility: HOSPITAL | Age: 59
End: 2019-08-23
Payer: COMMERCIAL

## 2019-08-23 DIAGNOSIS — N39.0 URINARY TRACT INFECTION: ICD-10-CM

## 2019-08-23 PROCEDURE — 87086 URINE CULTURE/COLONY COUNT: CPT | Performed by: INTERNAL MEDICINE

## 2019-09-09 RX ORDER — HYDROCODONE BITARTRATE AND ACETAMINOPHEN 10; 325 MG/1; MG/1
1 TABLET ORAL EVERY 6 HOURS PRN
Qty: 120 TABLET | Refills: 0 | Status: SHIPPED | OUTPATIENT
Start: 2019-09-09 | End: 2019-10-09

## 2019-09-09 RX ORDER — HYDROCODONE BITARTRATE AND ACETAMINOPHEN 10; 325 MG/1; MG/1
1 TABLET ORAL EVERY 6 HOURS PRN
Qty: 120 TABLET | Refills: 0 | Status: SHIPPED | OUTPATIENT
Start: 2019-10-10 | End: 2019-11-09

## 2019-09-26 ENCOUNTER — OFFICE VISIT (OUTPATIENT)
Dept: PAIN CLINIC | Facility: HOSPITAL | Age: 59
End: 2019-09-26
Attending: ANESTHESIOLOGY
Payer: COMMERCIAL

## 2019-09-26 DIAGNOSIS — G89.29 CHRONIC PAIN OF BOTH SHOULDERS: Primary | ICD-10-CM

## 2019-09-26 DIAGNOSIS — M25.512 CHRONIC PAIN OF BOTH SHOULDERS: Primary | ICD-10-CM

## 2019-09-26 DIAGNOSIS — G89.29 CHRONIC LOW BACK PAIN WITH SCIATICA, SCIATICA LATERALITY UNSPECIFIED, UNSPECIFIED BACK PAIN LATERALITY: ICD-10-CM

## 2019-09-26 DIAGNOSIS — M54.40 CHRONIC LOW BACK PAIN WITH SCIATICA, SCIATICA LATERALITY UNSPECIFIED, UNSPECIFIED BACK PAIN LATERALITY: ICD-10-CM

## 2019-09-26 DIAGNOSIS — M54.2 CERVICALGIA OF OCCIPITO-ATLANTO-AXIAL REGION: ICD-10-CM

## 2019-09-26 DIAGNOSIS — M50.30 DDD (DEGENERATIVE DISC DISEASE), CERVICAL: ICD-10-CM

## 2019-09-26 DIAGNOSIS — M25.511 CHRONIC PAIN OF BOTH SHOULDERS: Primary | ICD-10-CM

## 2019-09-26 PROCEDURE — 99213 OFFICE O/P EST LOW 20 MIN: CPT

## 2019-09-26 PROCEDURE — 20552 NJX 1/MLT TRIGGER POINT 1/2: CPT

## 2019-09-26 RX ORDER — DIAZEPAM 10 MG/1
10 TABLET ORAL EVERY 6 HOURS PRN
Qty: 30 TABLET | Refills: 2 | Status: SHIPPED | OUTPATIENT
Start: 2019-09-26 | End: 2019-10-26

## 2019-09-26 NOTE — CHRONIC PAIN
Maheshmarniemayank Rubio returns the office today status post left rotator cuff surgery  Doing better by occasionally he has a severe left arm pain which lasts for 20-30 minutes  He saw Dr. Jewels Thomson was told him that tell back a little bit on his rehab  Is here today requesting

## 2019-09-26 NOTE — PROGRESS NOTES
Patient presents to [facility] ambulatory for follow up, TPI injection. He has chronic left shoulder pain that radiates to his hand. He is doing PT now. 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   He takes norco and it helps and tramadol \"on real

## 2019-09-27 ENCOUNTER — TELEPHONE (OUTPATIENT)
Dept: PAIN CLINIC | Facility: HOSPITAL | Age: 59
End: 2019-09-27

## 2019-09-27 RX ORDER — TRAMADOL HYDROCHLORIDE 50 MG/1
50 TABLET ORAL EVERY 8 HOURS PRN
Qty: 45 TABLET | Refills: 0 | Status: SHIPPED | OUTPATIENT
Start: 2019-09-27 | End: 2019-10-12

## 2019-09-27 NOTE — TELEPHONE ENCOUNTER
Asked by Dr Laurent Davis to send tramadol one tab every 8 hours PRN for 15 days to patients pharmacy.

## 2019-11-08 ENCOUNTER — TELEPHONE (OUTPATIENT)
Dept: PAIN CLINIC | Facility: HOSPITAL | Age: 59
End: 2019-11-08

## 2019-11-08 RX ORDER — HYDROCODONE BITARTRATE AND ACETAMINOPHEN 10; 325 MG/1; MG/1
1 TABLET ORAL EVERY 6 HOURS PRN
Qty: 30 TABLET | Refills: 0 | Status: SHIPPED | OUTPATIENT
Start: 2019-11-08 | End: 2019-11-15

## 2019-11-08 NOTE — TELEPHONE ENCOUNTER
Pt called   Shoulder improving   Back neck still an issue  Been taking 4 / norco a day   Would like refill  Pt will schedule visit next week

## 2019-11-15 ENCOUNTER — OFFICE VISIT (OUTPATIENT)
Dept: PAIN CLINIC | Facility: HOSPITAL | Age: 59
End: 2019-11-15
Attending: ANESTHESIOLOGY
Payer: COMMERCIAL

## 2019-11-15 VITALS
SYSTOLIC BLOOD PRESSURE: 124 MMHG | BODY MASS INDEX: 27.59 KG/M2 | WEIGHT: 215 LBS | RESPIRATION RATE: 18 BRPM | HEIGHT: 74 IN | DIASTOLIC BLOOD PRESSURE: 74 MMHG | HEART RATE: 66 BPM

## 2019-11-15 DIAGNOSIS — M54.2 CERVICALGIA OF OCCIPITO-ATLANTO-AXIAL REGION: ICD-10-CM

## 2019-11-15 DIAGNOSIS — S76.301D RIGHT HAMSTRING INJURY, SUBSEQUENT ENCOUNTER: ICD-10-CM

## 2019-11-15 DIAGNOSIS — M54.40 CHRONIC LOW BACK PAIN WITH SCIATICA, SCIATICA LATERALITY UNSPECIFIED, UNSPECIFIED BACK PAIN LATERALITY: ICD-10-CM

## 2019-11-15 DIAGNOSIS — G89.29 CHRONIC LOW BACK PAIN WITH SCIATICA, SCIATICA LATERALITY UNSPECIFIED, UNSPECIFIED BACK PAIN LATERALITY: ICD-10-CM

## 2019-11-15 DIAGNOSIS — M25.511 CHRONIC PAIN OF BOTH SHOULDERS: Primary | ICD-10-CM

## 2019-11-15 DIAGNOSIS — G89.29 CHRONIC PAIN OF BOTH SHOULDERS: Primary | ICD-10-CM

## 2019-11-15 DIAGNOSIS — M25.512 CHRONIC PAIN OF BOTH SHOULDERS: Primary | ICD-10-CM

## 2019-11-15 PROCEDURE — 20552 NJX 1/MLT TRIGGER POINT 1/2: CPT

## 2019-11-15 PROCEDURE — 99213 OFFICE O/P EST LOW 20 MIN: CPT

## 2019-11-15 RX ORDER — CYCLOBENZAPRINE HCL 5 MG
5 TABLET ORAL 3 TIMES DAILY PRN
Qty: 90 TABLET | Refills: 0 | Status: SHIPPED | OUTPATIENT
Start: 2019-11-15 | End: 2019-12-15

## 2019-11-15 RX ORDER — HYDROCODONE BITARTRATE AND ACETAMINOPHEN 10; 325 MG/1; MG/1
1 TABLET ORAL EVERY 6 HOURS PRN
Qty: 120 TABLET | Refills: 0 | Status: SHIPPED | OUTPATIENT
Start: 2019-12-08 | End: 2019-11-15

## 2019-11-15 RX ORDER — DIAZEPAM 10 MG/1
10 TABLET ORAL NIGHTLY PRN
Qty: 30 TABLET | Refills: 2 | Status: SHIPPED | OUTPATIENT
Start: 2019-11-15 | End: 2019-12-15

## 2019-11-15 RX ORDER — HYDROCODONE BITARTRATE AND ACETAMINOPHEN 10; 325 MG/1; MG/1
1 TABLET ORAL EVERY 6 HOURS PRN
Qty: 120 TABLET | Refills: 0 | Status: SHIPPED | OUTPATIENT
Start: 2020-01-07 | End: 2020-02-06

## 2019-11-15 NOTE — CHRONIC PAIN
Initial Consultation Note      HISTORY OF PRESENT ILLNESS:  Yaneth PaizKelli is a 62year old old male referred to the pain clinic  for today evaluation for his bilateral shoulder pain fortunately Miguel Angel Zamudio has bilateral rotator cuff tears and has been recomme needed for Pain.  Disp: 30 tablet Rfl: 0   METOPROLOL TARTRATE 25 MG Oral Tab TAKE 1 TABLET BY MOUTH TWICE DAILY Disp: 60 tablet Rfl: 3   methylPREDNISolone 4 MG Oral Tablet Therapy Pack Take as directed on dose pack instructions Disp: 21 tablet Rfl: 0   ke (gastroesophageal reflux disease)    • High blood pressure    • Kidney stone    • Ruptured hamstring tendon 2015   • Spastic bladder        FAMILY HISTORY:  No family history on file.     SOCIAL HISTORY:  Social History    Socioeconomic History      Marital Tanning Salons in the Past: No        Hx of Radiation Treatments: No        Regular use of sun block: Not Asked    Social History Narrative      Not on file      ADVANCE CARE PLANNING:  Advance Care Plan NOT discussed.     PHYSICAL EXAMINATION:   04/25/19 12/30/2017     Lab Results   Component Value Date     04/10/2018    K 4.1 04/10/2018     04/10/2018    CO2 28 04/10/2018    BUN 12 04/10/2018     (H) 04/10/2018    CA 9.2 04/10/2018     No results found for: PT    [place] PHYSICIAN IQRA used sterilely dominant with each injection negative aspirations prior to the injection of the medication total of 6 cc of 25% Marcaine as well as 40 mg of Depo-Medrol in each shoulder were done he tolerated procedure well amount appropriate taken back and

## 2019-11-15 NOTE — PROGRESS NOTES
11/15/19        PRESENTS AMBULATORY TO CPM;   PT C/O BILATERAL  SHLDR PAN;  HAS HX TORN ROTATOR CUFF AND INJURY TO THE SHLDRS ;  PT REPORTS HE WILL BE GETTING SHOULDER SURGERY;  \"I HAVE TO BITE THE BULLET,  I CAN'T CONTINUE THIS WAY\";  RATES HIS PAIN 4/1

## 2020-02-14 ENCOUNTER — TELEPHONE (OUTPATIENT)
Dept: PAIN CLINIC | Facility: HOSPITAL | Age: 60
End: 2020-02-14

## 2020-02-14 RX ORDER — HYDROCODONE BITARTRATE AND ACETAMINOPHEN 10; 325 MG/1; MG/1
1 TABLET ORAL EVERY 8 HOURS PRN
Qty: 90 TABLET | Refills: 0 | Status: SHIPPED | OUTPATIENT
Start: 2020-02-14 | End: 2020-05-29

## 2020-02-14 RX ORDER — CELECOXIB 100 MG/1
100 CAPSULE ORAL 2 TIMES DAILY
Qty: 60 CAPSULE | Refills: 0 | Status: SHIPPED | OUTPATIENT
Start: 2020-02-14 | End: 2020-05-27

## 2020-02-14 RX ORDER — CYCLOBENZAPRINE HCL 10 MG
10 TABLET ORAL 3 TIMES DAILY PRN
Qty: 30 TABLET | Refills: 0 | Status: SHIPPED | OUTPATIENT
Start: 2020-02-14 | End: 2020-12-30

## 2020-02-14 NOTE — TELEPHONE ENCOUNTER
RX for norco, flexeril, and Celebrex sent to patients pharmacy per Dr. Isabel Bell.  Patient will follow up on Tuesday for an office visit

## 2020-03-13 ENCOUNTER — OFFICE VISIT (OUTPATIENT)
Dept: PAIN CLINIC | Facility: HOSPITAL | Age: 60
End: 2020-03-13
Attending: ANESTHESIOLOGY
Payer: COMMERCIAL

## 2020-03-13 VITALS
WEIGHT: 215 LBS | DIASTOLIC BLOOD PRESSURE: 78 MMHG | RESPIRATION RATE: 18 BRPM | SYSTOLIC BLOOD PRESSURE: 128 MMHG | HEIGHT: 74 IN | BODY MASS INDEX: 27.59 KG/M2

## 2020-03-13 DIAGNOSIS — Z98.890 SHOULDER PAIN WITH HISTORY OF REPAIR OF ROTATOR CUFF: Primary | ICD-10-CM

## 2020-03-13 DIAGNOSIS — G89.29 CHRONIC LOW BACK PAIN WITH SCIATICA, SCIATICA LATERALITY UNSPECIFIED, UNSPECIFIED BACK PAIN LATERALITY: ICD-10-CM

## 2020-03-13 DIAGNOSIS — S76.301D RIGHT HAMSTRING INJURY, SUBSEQUENT ENCOUNTER: ICD-10-CM

## 2020-03-13 DIAGNOSIS — M25.519 SHOULDER PAIN WITH HISTORY OF REPAIR OF ROTATOR CUFF: Primary | ICD-10-CM

## 2020-03-13 DIAGNOSIS — M54.40 CHRONIC LOW BACK PAIN WITH SCIATICA, SCIATICA LATERALITY UNSPECIFIED, UNSPECIFIED BACK PAIN LATERALITY: ICD-10-CM

## 2020-03-13 DIAGNOSIS — M50.30 DDD (DEGENERATIVE DISC DISEASE), CERVICAL: ICD-10-CM

## 2020-03-13 DIAGNOSIS — M54.2 CERVICALGIA OF OCCIPITO-ATLANTO-AXIAL REGION: ICD-10-CM

## 2020-03-13 PROCEDURE — 99213 OFFICE O/P EST LOW 20 MIN: CPT

## 2020-03-13 RX ORDER — HYDROCODONE BITARTRATE AND ACETAMINOPHEN 10; 325 MG/1; MG/1
1 TABLET ORAL EVERY 4 HOURS PRN
Qty: 180 TABLET | Refills: 0 | Status: SHIPPED | OUTPATIENT
Start: 2020-04-13 | End: 2020-05-13

## 2020-03-13 RX ORDER — HYDROCODONE BITARTRATE AND ACETAMINOPHEN 10; 325 MG/1; MG/1
1 TABLET ORAL EVERY 4 HOURS PRN
Qty: 180 TABLET | Refills: 0 | Status: SHIPPED | OUTPATIENT
Start: 2020-03-13 | End: 2020-03-13

## 2020-03-13 RX ORDER — METHYLPREDNISOLONE ACETATE 40 MG/ML
40 INJECTION, SUSPENSION INTRA-ARTICULAR; INTRALESIONAL; INTRAMUSCULAR; SOFT TISSUE ONCE
Status: DISCONTINUED | OUTPATIENT
Start: 2020-03-13 | End: 2020-05-29

## 2020-03-13 RX ORDER — ALPRAZOLAM 1 MG/1
1 TABLET ORAL 2 TIMES DAILY PRN
Qty: 60 TABLET | Refills: 0 | Status: SHIPPED | OUTPATIENT
Start: 2020-03-13 | End: 2020-04-12

## 2020-03-13 RX ORDER — CELECOXIB 200 MG/1
200 CAPSULE ORAL DAILY
Qty: 30 CAPSULE | Refills: 2 | Status: SHIPPED | OUTPATIENT
Start: 2020-03-13 | End: 2020-09-24

## 2020-03-13 RX ORDER — BUPIVACAINE HYDROCHLORIDE 2.5 MG/ML
10 INJECTION, SOLUTION EPIDURAL; INFILTRATION; INTRACAUDAL ONCE
Status: DISCONTINUED | OUTPATIENT
Start: 2020-03-13 | End: 2020-05-29

## 2020-03-13 NOTE — CHRONIC PAIN
Initial Consultation Note      HISTORY OF PRESENT ILLNESS:  Funmilayo Buchanan. is a 62year old old male referred to the pain clinic  for today evaluation for his shoulder pain as well as neck low back and hamstring.   He had surgery on his left shoulder and Tab Take 1 tablet (10 mg total) by mouth nightly as needed for Sleep. Disp: 30 tablet Rfl: 2   predniSONE 5 MG Oral Tab Take 1 tablet (5 mg total) by mouth daily.  Disp: 30 tablet Rfl: 2   traMADol HCl 50 MG Oral Tab Take 1 tablet (50 mg total) by mouth enil calf pain     Left foot pain     Arthritis of left shoulder region     Partial nontraumatic tear of rotator cuff, left     Tendinitis of long head of biceps     Chronic pain in right shoulder    Past Medical History:   Diagnosis Date   • Essential hyperten Handed: No        Right Handed: Yes        Currently spends a great deal of time in the sun: No        Past Sunlamp Treatments for Acne: Not Asked        History of tanning: No        Hx of Spending [address] of Time in [place]: No        Bad sunburns in the found.    LABS:  Lab Results   Component Value Date    WBC 7.1 12/30/2017    RBC 5.72 12/30/2017    HGB 17.6 (H) 12/30/2017    HCT 52.4 (H) 12/30/2017    MCV 91.7 12/30/2017    MCH 30.7 12/30/2017    MCHC 33.5 12/30/2017    RDW 13.6 12/30/2017     1 Procedure note   left shoulder joint injections   indication rotator cuff repair bilateral shoulder pain status post repair   all the risk and benefits of the procedure discussed in detail with Alfredito*in detail agreed to proceed a total of 40 mg of Depo-

## 2020-03-13 NOTE — PROGRESS NOTES
3/13/2020     PRESENTS AMBULATORY TO CPM;   PT C/O BILATERAL  SHLDR PAN;  HAS HX TORN ROTATOR CUFF AND INJURY TO THE SHLDRS ;  HAS BEEN GETTING  INJECTIONS TO THE SHLDR;  RATES HIS PAIN 9/10;  USES ICE TO HELP THE PAIN;    REQUESTING TPI TODAY;   SEEN BY CHELSEA

## 2020-04-14 ENCOUNTER — DOCUMENTATION ONLY (OUTPATIENT)
Dept: PAIN CLINIC | Facility: HOSPITAL | Age: 60
End: 2020-04-14

## 2020-05-04 ENCOUNTER — TELEPHONE (OUTPATIENT)
Dept: PAIN CLINIC | Facility: HOSPITAL | Age: 60
End: 2020-05-04

## 2020-05-04 RX ORDER — GABAPENTIN 300 MG/1
300 CAPSULE ORAL NIGHTLY
Qty: 30 CAPSULE | Refills: 2 | Status: SHIPPED | OUTPATIENT
Start: 2020-05-04 | End: 2020-09-24

## 2020-05-04 RX ORDER — GABAPENTIN 300 MG/1
300 CAPSULE ORAL NIGHTLY
COMMUNITY
End: 2020-05-04

## 2020-05-27 ENCOUNTER — TELEPHONE (OUTPATIENT)
Dept: PAIN CLINIC | Facility: HOSPITAL | Age: 60
End: 2020-05-27

## 2020-05-27 RX ORDER — ALPRAZOLAM 1 MG/1
1 TABLET ORAL 2 TIMES DAILY PRN
Qty: 60 TABLET | Refills: 1 | Status: SHIPPED | OUTPATIENT
Start: 2020-05-27 | End: 2020-06-26

## 2020-05-27 RX ORDER — CELECOXIB 100 MG/1
100 CAPSULE ORAL 2 TIMES DAILY
Qty: 60 CAPSULE | Refills: 0 | Status: SHIPPED | OUTPATIENT
Start: 2020-05-27 | End: 2020-06-22

## 2020-05-29 ENCOUNTER — OFFICE VISIT (OUTPATIENT)
Dept: PAIN CLINIC | Facility: HOSPITAL | Age: 60
End: 2020-05-29
Attending: ANESTHESIOLOGY
Payer: COMMERCIAL

## 2020-05-29 DIAGNOSIS — M79.601 ARM PAIN, MUSCULOSKELETAL, RIGHT: ICD-10-CM

## 2020-05-29 DIAGNOSIS — G89.29 CHRONIC LOW BACK PAIN WITH SCIATICA, SCIATICA LATERALITY UNSPECIFIED, UNSPECIFIED BACK PAIN LATERALITY: ICD-10-CM

## 2020-05-29 DIAGNOSIS — G89.29 CHRONIC LEFT SHOULDER PAIN: Primary | ICD-10-CM

## 2020-05-29 DIAGNOSIS — M54.40 CHRONIC LOW BACK PAIN WITH SCIATICA, SCIATICA LATERALITY UNSPECIFIED, UNSPECIFIED BACK PAIN LATERALITY: ICD-10-CM

## 2020-05-29 DIAGNOSIS — M54.2 CERVICALGIA OF OCCIPITO-ATLANTO-AXIAL REGION: ICD-10-CM

## 2020-05-29 DIAGNOSIS — M25.512 CHRONIC LEFT SHOULDER PAIN: Primary | ICD-10-CM

## 2020-05-29 PROCEDURE — 99213 OFFICE O/P EST LOW 20 MIN: CPT

## 2020-05-29 RX ORDER — HYDROCODONE BITARTRATE AND ACETAMINOPHEN 10; 325 MG/1; MG/1
1 TABLET ORAL EVERY 8 HOURS PRN
Qty: 90 TABLET | Refills: 0 | Status: SHIPPED | OUTPATIENT
Start: 2020-05-29 | End: 2020-06-28

## 2020-05-29 RX ORDER — METHYLPREDNISOLONE ACETATE 40 MG/ML
40 INJECTION, SUSPENSION INTRA-ARTICULAR; INTRALESIONAL; INTRAMUSCULAR; SOFT TISSUE ONCE
Status: DISCONTINUED | OUTPATIENT
Start: 2020-05-29 | End: 2020-08-31

## 2020-05-29 RX ORDER — BUPIVACAINE HYDROCHLORIDE 2.5 MG/ML
10 INJECTION, SOLUTION EPIDURAL; INFILTRATION; INTRACAUDAL ONCE
Status: DISCONTINUED | OUTPATIENT
Start: 2020-05-29 | End: 2020-08-31

## 2020-05-29 NOTE — CHRONIC PAIN
Follow-up Note    HISTORY OF PRESENT ILLNESS:  Conor Levin. is a 61year old old male, returns to the clinic for evaluation treatment of the shoulder pain Audrey Clamp had left rotator cuff repair 8 months ago bicep tenolysis as well as acromioplasty did well • Metoprolol Tartrate (LOPRESSOR) 50 MG Oral Tab Take 0.5 tablets (25 mg total) by mouth 2 (two) times daily. 90 tablet 3   • AmLODIPine Besylate 5 MG Oral Tab Take 1 tablet (5 mg total) by mouth daily.  90 tablet 3   • ketoconazole 2 % External Cream pain     Left foot pain     Arthritis of left shoulder region     Partial nontraumatic tear of rotator cuff, left     Tendinitis of long head of biceps     Chronic pain in right shoulder    Past Medical History:   Diagnosis Date   • Essential hypertension file        Physically abused: Not on file        Forced sexual activity: Not on file    Other Topics      Concerns:        Left Handed: No        Right Handed: Yes        Currently spends a great deal of time in the sun: No        Past Sunlamp Treatments cervicalgia lumbar DDD       PLAN:  RECOMMENDATIONS:  Bilateral trigger point injections shoulders  Renew the Norco     follow-up as needed    Procedure note   bilateral shoulder trigger point injections   all the risk and benefits of the procedure discuss

## 2020-05-29 NOTE — PROGRESS NOTES
5/29/2020    PRESENTS AMBULATORY TO CPM;   PT C/O BILATERAL  SHLDR PAN;  HAS HX TORN ROTATOR CUFF AND INJURY TO THE SHLDRS ;  HAS BEEN GETTING  INJECTIONS TO THE SHLDR;  RATES HIS PAIN 8/10;  USES ICE TO HELP THE PAIN;    REQUESTING TPI TODAY;   SEEN BY

## 2020-06-22 RX ORDER — CELECOXIB 100 MG/1
CAPSULE ORAL
Qty: 60 CAPSULE | Refills: 0 | Status: SHIPPED | OUTPATIENT
Start: 2020-06-22 | End: 2020-12-30 | Stop reason: DRUGHIGH

## 2020-07-02 ENCOUNTER — TELEPHONE (OUTPATIENT)
Dept: PAIN CLINIC | Facility: HOSPITAL | Age: 60
End: 2020-07-02

## 2020-07-06 RX ORDER — CELECOXIB 100 MG/1
CAPSULE ORAL
Qty: 60 CAPSULE | Refills: 0 | OUTPATIENT
Start: 2020-07-06

## 2020-07-10 RX ORDER — HYDROCODONE BITARTRATE AND ACETAMINOPHEN 10; 325 MG/1; MG/1
1 TABLET ORAL EVERY 8 HOURS PRN
Qty: 90 TABLET | Refills: 0 | Status: SHIPPED | OUTPATIENT
Start: 2020-07-10 | End: 2020-08-09

## 2020-07-10 NOTE — TELEPHONE ENCOUNTER
Discussed with Yamile Lemus  Doing well  Taking occasional norco for shoulder neck pain  Asking for refill

## 2020-07-23 RX ORDER — DIAZEPAM 10 MG/1
TABLET ORAL
Qty: 30 TABLET | Refills: 1 | Status: SHIPPED | OUTPATIENT
Start: 2020-07-23 | End: 2020-12-15

## 2020-08-10 ENCOUNTER — TELEPHONE (OUTPATIENT)
Dept: PAIN CLINIC | Facility: HOSPITAL | Age: 60
End: 2020-08-10

## 2020-08-10 RX ORDER — HYDROCODONE BITARTRATE AND ACETAMINOPHEN 10; 325 MG/1; MG/1
1 TABLET ORAL EVERY 6 HOURS PRN
Qty: 90 TABLET | Refills: 0 | OUTPATIENT
Start: 2020-08-10 | End: 2020-09-09

## 2020-08-10 RX ORDER — HYDROCODONE BITARTRATE AND ACETAMINOPHEN 10; 325 MG/1; MG/1
1 TABLET ORAL EVERY 8 HOURS PRN
Qty: 90 TABLET | Refills: 0 | Status: SHIPPED | OUTPATIENT
Start: 2020-08-10 | End: 2020-09-09

## 2020-08-10 NOTE — TELEPHONE ENCOUNTER
Continues to be stable with occassional norco for his neck shoulder and low back pain  Will visit the pain clinic soon

## 2020-08-31 ENCOUNTER — TELEPHONE (OUTPATIENT)
Dept: PAIN CLINIC | Facility: HOSPITAL | Age: 60
End: 2020-08-31

## 2020-08-31 RX ORDER — HYDROCODONE BITARTRATE AND ACETAMINOPHEN 10; 325 MG/1; MG/1
1 TABLET ORAL EVERY 8 HOURS PRN
Qty: 90 TABLET | Refills: 0 | Status: SHIPPED | OUTPATIENT
Start: 2020-08-31 | End: 2020-09-03

## 2020-08-31 NOTE — TELEPHONE ENCOUNTER
Acutely injured hamstring  asksergiog for refill on norco   Last prescribed on 8/10/20 #90  Cautioned reliance on pain meds in order to work   He  will come in for a visit at the office this week   Also would like a trigger point   Rest ice advised in conjuct

## 2020-09-03 ENCOUNTER — OFFICE VISIT (OUTPATIENT)
Dept: PAIN CLINIC | Facility: HOSPITAL | Age: 60
End: 2020-09-03
Attending: ANESTHESIOLOGY
Payer: COMMERCIAL

## 2020-09-03 VITALS
DIASTOLIC BLOOD PRESSURE: 72 MMHG | HEART RATE: 70 BPM | WEIGHT: 215 LBS | BODY MASS INDEX: 27.59 KG/M2 | SYSTOLIC BLOOD PRESSURE: 132 MMHG | RESPIRATION RATE: 18 BRPM | HEIGHT: 74 IN

## 2020-09-03 DIAGNOSIS — M25.512 ACUTE SHOULDER PAIN DUE TO TRAUMA, LEFT: Primary | ICD-10-CM

## 2020-09-03 DIAGNOSIS — M25.512 CHRONIC LEFT SHOULDER PAIN: ICD-10-CM

## 2020-09-03 DIAGNOSIS — Z98.890 SHOULDER PAIN WITH HISTORY OF REPAIR OF ROTATOR CUFF: ICD-10-CM

## 2020-09-03 DIAGNOSIS — M25.519 SHOULDER PAIN WITH HISTORY OF REPAIR OF ROTATOR CUFF: ICD-10-CM

## 2020-09-03 DIAGNOSIS — M50.30 DDD (DEGENERATIVE DISC DISEASE), CERVICAL: ICD-10-CM

## 2020-09-03 DIAGNOSIS — G89.29 CHRONIC LEFT SHOULDER PAIN: ICD-10-CM

## 2020-09-03 DIAGNOSIS — G89.11 ACUTE SHOULDER PAIN DUE TO TRAUMA, LEFT: Primary | ICD-10-CM

## 2020-09-03 PROCEDURE — 20552 NJX 1/MLT TRIGGER POINT 1/2: CPT

## 2020-09-03 PROCEDURE — 99213 OFFICE O/P EST LOW 20 MIN: CPT

## 2020-09-03 RX ORDER — HYDROCODONE BITARTRATE AND ACETAMINOPHEN 10; 325 MG/1; MG/1
1 TABLET ORAL EVERY 8 HOURS PRN
Qty: 90 TABLET | Refills: 0 | Status: CANCELLED | OUTPATIENT
Start: 2020-10-03 | End: 2020-11-02

## 2020-09-03 RX ORDER — NABUMETONE 500 MG/1
500 TABLET, FILM COATED ORAL 2 TIMES DAILY PRN
Qty: 60 TABLET | Refills: 0 | Status: SHIPPED | OUTPATIENT
Start: 2020-09-03 | End: 2020-10-03

## 2020-09-03 RX ORDER — BUPIVACAINE HYDROCHLORIDE 2.5 MG/ML
10 INJECTION, SOLUTION EPIDURAL; INFILTRATION; INTRACAUDAL ONCE
Status: DISCONTINUED | OUTPATIENT
Start: 2020-09-03 | End: 2020-12-30 | Stop reason: ALTCHOICE

## 2020-09-03 RX ORDER — HYDROCODONE BITARTRATE AND ACETAMINOPHEN 10; 325 MG/1; MG/1
1 TABLET ORAL EVERY 8 HOURS PRN
Qty: 90 TABLET | Refills: 0 | Status: SHIPPED | OUTPATIENT
Start: 2020-09-03 | End: 2020-10-03

## 2020-09-03 RX ORDER — METHYLPREDNISOLONE ACETATE 40 MG/ML
40 INJECTION, SUSPENSION INTRA-ARTICULAR; INTRALESIONAL; INTRAMUSCULAR; SOFT TISSUE ONCE
Status: DISCONTINUED | OUTPATIENT
Start: 2020-09-03 | End: 2020-12-30 | Stop reason: ALTCHOICE

## 2020-09-03 NOTE — CHRONIC PAIN
Follow-up Note    HISTORY OF PRESENT ILLNESS:  Tae Jimenez. is a 61year old old male, returns to the clinic for evaluation after a fall while swinging a baseball bat and landing on his left shoulder.   Mr. Kym Lindsay is known to this pain center for con Oral Tab Take 1 tablet (10 mg total) by mouth 3 (three) times daily as needed for Muscle spasms. 30 tablet 0   • Naloxone HCl 4 MG/0.1ML Nasal Liquid 4 mg by Nasal route as needed.  If patient remains unresponsive, repeat dose in other nostril 2-5 minutes a of right knee     Internal derangement of knee joint     Pain of right hip joint     Acute pain of left knee     Internal derangement of left knee     Rotator cuff tear arthropathy of right shoulder     Impingement syndrome of right shoulder     Hamstring file      Stress: Not on file    Relationships      Social connections:        Talks on phone: Not on file        Gets together: Not on file        Attends [employer] service: Not on file        Active member of club or organization: Not on file        Atte reflexes: Normal               IMAGING:  No results found.     [place] PHYSICIAN MONITORING PROGRAM REVIEWED  No      ASSESSMENT:   Mejia Martines. is a 61year old  male, with status post left rotator cuff repair full-thickness tear right shoulder cervicalgia a comprehensive interactive discussion. All questions were answered during extended questions and answer session. Patient agreeable to discussion plan.  Greater than 50% of the time was spent with counseling (nature of discussion centered around pain, thera

## 2020-09-03 NOTE — PROGRESS NOTES
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 Hira Michel;  Gary Land

## 2020-09-24 RX ORDER — CELECOXIB 200 MG/1
200 CAPSULE ORAL DAILY
Qty: 30 CAPSULE | Refills: 1 | Status: SHIPPED | OUTPATIENT
Start: 2020-09-24 | End: 2020-10-24

## 2020-09-24 RX ORDER — GABAPENTIN 300 MG/1
CAPSULE ORAL
Qty: 30 CAPSULE | Refills: 1 | Status: SHIPPED | OUTPATIENT
Start: 2020-09-24 | End: 2020-12-15

## 2020-10-21 ENCOUNTER — TELEPHONE (OUTPATIENT)
Dept: PAIN CLINIC | Facility: HOSPITAL | Age: 60
End: 2020-10-21

## 2020-10-21 RX ORDER — HYDROCODONE BITARTRATE AND ACETAMINOPHEN 10; 325 MG/1; MG/1
1 TABLET ORAL EVERY 8 HOURS PRN
Qty: 90 TABLET | Refills: 0 | Status: SHIPPED | OUTPATIENT
Start: 2020-10-21 | End: 2020-11-20

## 2020-10-21 RX ORDER — GABAPENTIN 300 MG/1
300 CAPSULE ORAL 3 TIMES DAILY
Qty: 90 CAPSULE | Refills: 0 | Status: SHIPPED | OUTPATIENT
Start: 2020-10-21 | End: 2020-11-19

## 2020-10-21 NOTE — TELEPHONE ENCOUNTER
Requesting refill for pain meds   Pt stable   Last renewal reviewed   Will have office visti before year end  Continue multiple orthopedic issues

## 2020-10-26 ENCOUNTER — TELEPHONE (OUTPATIENT)
Dept: PAIN CLINIC | Facility: HOSPITAL | Age: 60
End: 2020-10-26

## 2020-10-26 RX ORDER — CELECOXIB 200 MG/1
200 CAPSULE ORAL DAILY
Qty: 30 CAPSULE | Refills: 2 | Status: SHIPPED | OUTPATIENT
Start: 2020-10-26 | End: 2021-01-24

## 2020-11-13 ENCOUNTER — TELEPHONE (OUTPATIENT)
Dept: PAIN CLINIC | Facility: HOSPITAL | Age: 60
End: 2020-11-13

## 2020-11-13 RX ORDER — HYDROCODONE BITARTRATE AND ACETAMINOPHEN 10; 325 MG/1; MG/1
1-2 TABLET ORAL EVERY 4 HOURS PRN
Qty: 180 TABLET | Refills: 0 | Status: SHIPPED | OUTPATIENT
Start: 2020-11-13 | End: 2020-12-13

## 2020-11-19 PROBLEM — R60.0 LOCALIZED EDEMA: Status: ACTIVE | Noted: 2020-11-19

## 2020-11-19 RX ORDER — GABAPENTIN 300 MG/1
CAPSULE ORAL
Qty: 90 CAPSULE | Refills: 0 | Status: SHIPPED | OUTPATIENT
Start: 2020-11-19 | End: 2020-12-15

## 2020-12-03 ENCOUNTER — HOSPITAL ENCOUNTER (OUTPATIENT)
Dept: GENERAL RADIOLOGY | Age: 60
Discharge: HOME | End: 2020-12-03
Attending: UROLOGY
Payer: COMMERCIAL

## 2020-12-03 ENCOUNTER — HOSPITAL ENCOUNTER (OUTPATIENT)
Dept: ULTRASOUND IMAGING | Age: 60
Discharge: HOME | End: 2020-12-03
Attending: UROLOGY
Payer: COMMERCIAL

## 2020-12-03 ENCOUNTER — LAB ENCOUNTER (OUTPATIENT)
Dept: LAB | Age: 60
End: 2020-12-03
Attending: UROLOGY
Payer: COMMERCIAL

## 2020-12-03 DIAGNOSIS — N20.0 KIDNEY STONE: Primary | ICD-10-CM

## 2020-12-03 DIAGNOSIS — N13.9 OBSTRUCTED, UROPATHY: ICD-10-CM

## 2020-12-03 DIAGNOSIS — R97.20 ELEVATED PSA: ICD-10-CM

## 2020-12-03 DIAGNOSIS — N20.0 KIDNEY STONE: ICD-10-CM

## 2020-12-03 DIAGNOSIS — N13.9 OBSTRUCTIVE UROPATHY: ICD-10-CM

## 2020-12-03 PROCEDURE — 84154 ASSAY OF PSA FREE: CPT

## 2020-12-03 PROCEDURE — 76857 US EXAM PELVIC LIMITED: CPT | Performed by: UROLOGY

## 2020-12-03 PROCEDURE — 36415 COLL VENOUS BLD VENIPUNCTURE: CPT

## 2020-12-03 PROCEDURE — 80048 BASIC METABOLIC PNL TOTAL CA: CPT

## 2020-12-03 PROCEDURE — 74018 RADEX ABDOMEN 1 VIEW: CPT | Performed by: UROLOGY

## 2020-12-03 PROCEDURE — 84153 ASSAY OF PSA TOTAL: CPT

## 2020-12-15 RX ORDER — GABAPENTIN 300 MG/1
300 CAPSULE ORAL 3 TIMES DAILY
Qty: 90 CAPSULE | Refills: 1 | Status: SHIPPED | OUTPATIENT
Start: 2020-12-15 | End: 2021-02-05

## 2020-12-29 ENCOUNTER — DOCUMENTATION ONLY (OUTPATIENT)
Dept: PAIN CLINIC | Facility: HOSPITAL | Age: 60
End: 2020-12-29

## 2020-12-29 NOTE — PROGRESS NOTES
Procedure code [facility] needed per Renita Whitney at # [phone]  Spoke with Maritza Pontiff given to the agent, case has been approved    auth # [id number]  Valid from 12/29/2020--02/27/2021    Order form faxed to t

## 2020-12-31 ENCOUNTER — ANESTHESIA (OUTPATIENT)
Dept: SURGERY | Facility: HOSPITAL | Age: 60
End: 2020-12-31
Payer: COMMERCIAL

## 2020-12-31 ENCOUNTER — APPOINTMENT (OUTPATIENT)
Dept: GENERAL RADIOLOGY | Facility: HOSPITAL | Age: 60
End: 2020-12-31
Attending: ANESTHESIOLOGY
Payer: COMMERCIAL

## 2020-12-31 ENCOUNTER — HOSPITAL ENCOUNTER (OUTPATIENT)
Facility: HOSPITAL | Age: 60
Setting detail: HOSPITAL OUTPATIENT SURGERY
Discharge: HOME | End: 2020-12-31
Attending: ANESTHESIOLOGY | Admitting: ANESTHESIOLOGY
Payer: COMMERCIAL

## 2020-12-31 ENCOUNTER — ANESTHESIA EVENT (OUTPATIENT)
Dept: SURGERY | Facility: HOSPITAL | Age: 60
End: 2020-12-31
Payer: COMMERCIAL

## 2020-12-31 VITALS
RESPIRATION RATE: 12 BRPM | HEART RATE: 70 BPM | TEMPERATURE: 98 F | SYSTOLIC BLOOD PRESSURE: 132 MMHG | OXYGEN SATURATION: 98 % | DIASTOLIC BLOOD PRESSURE: 66 MMHG

## 2020-12-31 PROCEDURE — 3E023BZ INTRODUCTION OF ANESTHETIC AGENT INTO MUSCLE, PERCUTANEOUS APPROACH: ICD-10-PCS | Performed by: ANESTHESIOLOGY

## 2020-12-31 PROCEDURE — 3E0R33Z INTRODUCTION OF ANTI-INFLAMMATORY INTO SPINAL CANAL, PERCUTANEOUS APPROACH: ICD-10-PCS | Performed by: ANESTHESIOLOGY

## 2020-12-31 PROCEDURE — 3E0233Z INTRODUCTION OF ANTI-INFLAMMATORY INTO MUSCLE, PERCUTANEOUS APPROACH: ICD-10-PCS | Performed by: ANESTHESIOLOGY

## 2020-12-31 RX ORDER — DEXAMETHASONE SODIUM PHOSPHATE 10 MG/ML
INJECTION, SOLUTION INTRAMUSCULAR; INTRAVENOUS AS NEEDED
Status: DISCONTINUED | OUTPATIENT
Start: 2020-12-31 | End: 2020-12-31 | Stop reason: HOSPADM

## 2020-12-31 RX ORDER — CELECOXIB 200 MG/1
200 CAPSULE ORAL DAILY
Qty: 30 CAPSULE | Refills: 1 | Status: SHIPPED | OUTPATIENT
Start: 2020-12-31 | End: 2021-01-30

## 2020-12-31 RX ORDER — HYDROCODONE BITARTRATE AND ACETAMINOPHEN 10; 325 MG/1; MG/1
1 TABLET ORAL EVERY 6 HOURS PRN
Qty: 120 TABLET | Refills: 0 | Status: SHIPPED | OUTPATIENT
Start: 2021-01-31 | End: 2021-03-02

## 2020-12-31 NOTE — PROCEDURES
Lumbar epidural steroid injection L5-S1  Trigger point injection left bicep tendon all the risk and benefits of procedures in detail with Alfredito*in detail agreed to proceed patient made in prone position sterile prep dressed usual fashion L5-S1 Zosyn fluoros

## 2020-12-31 NOTE — ANESTHESIA PROCEDURE NOTES
Epidural Block    Date/Time: 12/31/2020 1:01 PM  Performed by: Malathi Loera MD  Authorized by: Malathi Loera MD     General Information and Staff    Start Time:  12/31/2020 1:00 PM  End Time:  12/31/2020 1:00 PM  Anesthesiologist:  Laurent Davis,

## 2020-12-31 NOTE — H&P
[facility]    History and Physical    Montel Simmonds.  Patient Status:  Hospital Outpatient Surgery    1960 MRN [id number]   Location [address] [facility] Attending Corazon Ochoa MD   [address] [facility] Day # 0 PCP Samantha Covington, tablet (25 mg total) by mouth daily. •  traMADol HCl 50 MG Oral Tab, Take 1 tablet (50 mg total) by mouth every 6 (six) hours as needed for Pain. •  Cyanocobalamin (B-12) 500 MCG Oral Tab, Take 1 tablet by mouth daily.       •  B Complex-C-E-Zn (TRACI

## 2021-01-11 ENCOUNTER — TELEPHONE (OUTPATIENT)
Dept: PAIN CLINIC | Facility: HOSPITAL | Age: 61
End: 2021-01-11

## 2021-01-11 RX ORDER — HYDROCODONE BITARTRATE AND ACETAMINOPHEN 10; 325 MG/1; MG/1
1 TABLET ORAL EVERY 6 HOURS PRN
Qty: 90 TABLET | Refills: 0 | Status: SHIPPED | OUTPATIENT
Start: 2021-01-11 | End: 2021-02-10

## 2021-02-02 ENCOUNTER — TELEPHONE (OUTPATIENT)
Dept: PAIN CLINIC | Facility: HOSPITAL | Age: 61
End: 2021-02-02

## 2021-02-02 RX ORDER — METHYLPREDNISOLONE 4 MG/1
TABLET ORAL
Qty: 21 TABLET | Refills: 0 | Status: SHIPPED | OUTPATIENT
Start: 2021-02-02 | End: 2021-04-13

## 2021-02-05 RX ORDER — GABAPENTIN 300 MG/1
CAPSULE ORAL
Qty: 90 CAPSULE | Refills: 1 | Status: SHIPPED | OUTPATIENT
Start: 2021-02-05 | End: 2021-08-26

## 2021-02-09 ENCOUNTER — DOCUMENTATION ONLY (OUTPATIENT)
Dept: PAIN CLINIC | Facility: HOSPITAL | Age: 61
End: 2021-02-09

## 2021-02-09 NOTE — PROGRESS NOTES
Procedure code [address] [facility] 90 at 8:57am @ # [phone]   spoke with Leonardo Velazquez, clinical info given over the phone   Case has raad pproved    auth # [id number]  Valid from 02/11/21----02/24/21

## 2021-02-25 ENCOUNTER — LAB ENCOUNTER (OUTPATIENT)
Dept: LAB | Age: 61
End: 2021-02-25
Attending: UROLOGY
Payer: COMMERCIAL

## 2021-02-25 ENCOUNTER — EKG ENCOUNTER (OUTPATIENT)
Dept: LAB | Age: 61
End: 2021-02-25
Attending: UROLOGY
Payer: COMMERCIAL

## 2021-02-25 DIAGNOSIS — I10 ESSENTIAL HYPERTENSION: ICD-10-CM

## 2021-02-25 DIAGNOSIS — Z01.818 PRE-OP TESTING: ICD-10-CM

## 2021-02-25 DIAGNOSIS — N20.0 KIDNEY STONES: ICD-10-CM

## 2021-02-25 LAB
ANION GAP SERPL CALC-SCNC: 6 MMOL/L (ref 0–18)
BASOPHILS # BLD AUTO: 0.06 X10(3) UL (ref 0–0.2)
BASOPHILS NFR BLD AUTO: 0.5 %
BUN BLD-MCNC: 22 MG/DL (ref 7–18)
BUN/CREAT SERPL: 16.8 (ref 10–20)
CALCIUM BLD-MCNC: 8.9 MG/DL (ref 8.5–10.1)
CHLORIDE SERPL-SCNC: 105 MMOL/L (ref 98–112)
CO2 SERPL-SCNC: 28 MMOL/L (ref 21–32)
CREAT BLD-MCNC: 1.31 MG/DL
DEPRECATED RDW RBC AUTO: 42.5 FL (ref 35.1–46.3)
EOSINOPHIL # BLD AUTO: 0.25 X10(3) UL (ref 0–0.7)
EOSINOPHIL NFR BLD AUTO: 2.3 %
ERYTHROCYTE [DISTWIDTH] IN BLOOD BY AUTOMATED COUNT: 13.1 % (ref 11–15)
GLUCOSE BLD-MCNC: 85 MG/DL (ref 70–99)
HCT VFR BLD AUTO: 48.3 %
HGB BLD-MCNC: 16.7 G/DL
IMM GRANULOCYTES # BLD AUTO: 0.07 X10(3) UL (ref 0–1)
IMM GRANULOCYTES NFR BLD: 0.6 %
LYMPHOCYTES # BLD AUTO: 1.35 X10(3) UL (ref 1–4)
LYMPHOCYTES NFR BLD AUTO: 12.2 %
MCH RBC QN AUTO: 31 PG (ref 26–34)
MCHC RBC AUTO-ENTMCNC: 34.6 G/DL (ref 31–37)
MCV RBC AUTO: 89.6 FL
MONOCYTES # BLD AUTO: 0.81 X10(3) UL (ref 0.1–1)
MONOCYTES NFR BLD AUTO: 7.3 %
NEUTROPHILS # BLD AUTO: 8.51 X10 (3) UL (ref 1.5–7.7)
NEUTROPHILS # BLD AUTO: 8.51 X10(3) UL (ref 1.5–7.7)
NEUTROPHILS NFR BLD AUTO: 77.1 %
OSMOLALITY SERPL CALC.SUM OF ELEC: 291 MOSM/KG (ref 275–295)
PATIENT FASTING Y/N/NP: YES
PLATELET # BLD AUTO: 174 10(3)UL (ref 150–450)
POTASSIUM SERPL-SCNC: 3.3 MMOL/L (ref 3.5–5.1)
RBC # BLD AUTO: 5.39 X10(6)UL
SODIUM SERPL-SCNC: 139 MMOL/L (ref 136–145)
WBC # BLD AUTO: 11.1 X10(3) UL (ref 4–11)

## 2021-02-25 PROCEDURE — 80048 BASIC METABOLIC PNL TOTAL CA: CPT

## 2021-02-25 PROCEDURE — 85025 COMPLETE CBC W/AUTO DIFF WBC: CPT

## 2021-02-25 PROCEDURE — 93010 ELECTROCARDIOGRAM REPORT: CPT | Performed by: UROLOGY

## 2021-02-25 PROCEDURE — 36415 COLL VENOUS BLD VENIPUNCTURE: CPT

## 2021-02-25 PROCEDURE — 93005 ELECTROCARDIOGRAM TRACING: CPT

## 2021-03-15 RX ORDER — CELECOXIB 100 MG/1
100 CAPSULE ORAL 2 TIMES DAILY
COMMUNITY
End: 2021-04-13

## 2021-03-15 RX ORDER — CELECOXIB 100 MG/1
100 CAPSULE ORAL 2 TIMES DAILY
Qty: 60 CAPSULE | Refills: 0 | OUTPATIENT
Start: 2021-03-15 | End: 2021-04-14

## 2021-03-16 ENCOUNTER — TELEPHONE (OUTPATIENT)
Dept: PAIN CLINIC | Facility: HOSPITAL | Age: 61
End: 2021-03-16

## 2021-03-16 RX ORDER — CELECOXIB 200 MG/1
200 CAPSULE ORAL 2 TIMES DAILY
Qty: 60 CAPSULE | Refills: 0 | Status: ON HOLD | OUTPATIENT
Start: 2021-03-16 | End: 2021-04-14

## 2021-03-16 RX ORDER — HYDROCODONE BITARTRATE AND ACETAMINOPHEN 10; 325 MG/1; MG/1
1 TABLET ORAL EVERY 6 HOURS PRN
Qty: 120 TABLET | Refills: 0 | Status: SHIPPED | OUTPATIENT
Start: 2021-03-16 | End: 2021-04-15

## 2021-03-16 NOTE — TELEPHONE ENCOUNTER
Refill for his norco   S/p l;ithotripsy with tylenol 3  Also requesting norco and celebrex for his continues back shoulder issues

## 2021-04-08 ENCOUNTER — HOSPITAL ENCOUNTER (OUTPATIENT)
Dept: GENERAL RADIOLOGY | Facility: HOSPITAL | Age: 61
Discharge: HOME | End: 2021-04-08
Attending: NEUROLOGICAL SURGERY
Payer: COMMERCIAL

## 2021-04-08 ENCOUNTER — LAB ENCOUNTER (OUTPATIENT)
Dept: LAB | Facility: HOSPITAL | Age: 61
End: 2021-04-08
Attending: NEUROLOGICAL SURGERY
Payer: COMMERCIAL

## 2021-04-08 DIAGNOSIS — Z01.811 ENCOUNTER FOR PREOPERATIVE PULMONARY EXAMINATION: ICD-10-CM

## 2021-04-08 DIAGNOSIS — Z01.812 PRE-OPERATIVE LABORATORY EXAMINATION: Primary | ICD-10-CM

## 2021-04-08 PROCEDURE — 81003 URINALYSIS AUTO W/O SCOPE: CPT

## 2021-04-08 PROCEDURE — 85025 COMPLETE CBC W/AUTO DIFF WBC: CPT

## 2021-04-08 PROCEDURE — 85610 PROTHROMBIN TIME: CPT

## 2021-04-08 PROCEDURE — 87641 MR-STAPH DNA AMP PROBE: CPT

## 2021-04-08 PROCEDURE — 80048 BASIC METABOLIC PNL TOTAL CA: CPT

## 2021-04-08 PROCEDURE — 71046 X-RAY EXAM CHEST 2 VIEWS: CPT | Performed by: NEUROLOGICAL SURGERY

## 2021-04-08 PROCEDURE — 36415 COLL VENOUS BLD VENIPUNCTURE: CPT

## 2021-04-09 ENCOUNTER — LAB REQUISITION (OUTPATIENT)
Dept: LAB | Facility: HOSPITAL | Age: 61
End: 2021-04-09
Payer: COMMERCIAL

## 2021-04-09 DIAGNOSIS — Z01.818 ENCOUNTER FOR OTHER PREPROCEDURAL EXAMINATION: ICD-10-CM

## 2021-04-13 ENCOUNTER — LAB ENCOUNTER (OUTPATIENT)
Dept: LAB | Age: 61
End: 2021-04-13
Attending: NEUROLOGICAL SURGERY
Payer: COMMERCIAL

## 2021-04-13 DIAGNOSIS — Z01.818 PREOP TESTING: ICD-10-CM

## 2021-04-14 ENCOUNTER — ANESTHESIA (OUTPATIENT)
Dept: SURGERY | Facility: HOSPITAL | Age: 61
End: 2021-04-14
Payer: COMMERCIAL

## 2021-04-14 ENCOUNTER — ANESTHESIA EVENT (OUTPATIENT)
Dept: SURGERY | Facility: HOSPITAL | Age: 61
End: 2021-04-14
Payer: COMMERCIAL

## 2021-04-14 ENCOUNTER — APPOINTMENT (OUTPATIENT)
Dept: GENERAL RADIOLOGY | Facility: HOSPITAL | Age: 61
End: 2021-04-14
Attending: NEUROLOGICAL SURGERY
Payer: COMMERCIAL

## 2021-04-14 ENCOUNTER — HOSPITAL ENCOUNTER (OUTPATIENT)
Facility: HOSPITAL | Age: 61
Setting detail: HOSPITAL OUTPATIENT SURGERY
Discharge: HOME | End: 2021-04-14
Attending: NEUROLOGICAL SURGERY | Admitting: NEUROLOGICAL SURGERY
Payer: COMMERCIAL

## 2021-04-14 VITALS
DIASTOLIC BLOOD PRESSURE: 76 MMHG | SYSTOLIC BLOOD PRESSURE: 121 MMHG | HEART RATE: 52 BPM | WEIGHT: 227 LBS | HEIGHT: 74 IN | RESPIRATION RATE: 18 BRPM | OXYGEN SATURATION: 96 % | TEMPERATURE: 98 F | BODY MASS INDEX: 29.13 KG/M2

## 2021-04-14 DIAGNOSIS — Z01.818 PREOP TESTING: Primary | ICD-10-CM

## 2021-04-14 PROCEDURE — 3E0R33Z INTRODUCTION OF ANTI-INFLAMMATORY INTO SPINAL CANAL, PERCUTANEOUS APPROACH: ICD-10-PCS | Performed by: NEUROLOGICAL SURGERY

## 2021-04-14 PROCEDURE — 76000 FLUOROSCOPY <1 HR PHYS/QHP: CPT | Performed by: NEUROLOGICAL SURGERY

## 2021-04-14 PROCEDURE — 01NB0ZZ RELEASE LUMBAR NERVE, OPEN APPROACH: ICD-10-PCS | Performed by: NEUROLOGICAL SURGERY

## 2021-04-14 PROCEDURE — 88305 TISSUE EXAM BY PATHOLOGIST: CPT | Performed by: NEUROLOGICAL SURGERY

## 2021-04-14 PROCEDURE — 88304 TISSUE EXAM BY PATHOLOGIST: CPT | Performed by: NEUROLOGICAL SURGERY

## 2021-04-14 RX ORDER — DEXAMETHASONE SODIUM PHOSPHATE 4 MG/ML
VIAL (ML) INJECTION AS NEEDED
Status: DISCONTINUED | OUTPATIENT
Start: 2021-04-14 | End: 2021-04-14 | Stop reason: SURG

## 2021-04-14 RX ORDER — GLYCOPYRROLATE 0.2 MG/ML
INJECTION, SOLUTION INTRAMUSCULAR; INTRAVENOUS AS NEEDED
Status: DISCONTINUED | OUTPATIENT
Start: 2021-04-14 | End: 2021-04-14 | Stop reason: SURG

## 2021-04-14 RX ORDER — CEFAZOLIN SODIUM/WATER 2 G/20 ML
2 SYRINGE (ML) INTRAVENOUS ONCE
Status: DISCONTINUED | OUTPATIENT
Start: 2021-04-14 | End: 2021-04-14 | Stop reason: HOSPADM

## 2021-04-14 RX ORDER — EPHEDRINE SULFATE 50 MG/ML
INJECTION, SOLUTION INTRAVENOUS AS NEEDED
Status: DISCONTINUED | OUTPATIENT
Start: 2021-04-14 | End: 2021-04-14 | Stop reason: SURG

## 2021-04-14 RX ORDER — HYDROMORPHONE HYDROCHLORIDE 1 MG/ML
0.2 INJECTION, SOLUTION INTRAMUSCULAR; INTRAVENOUS; SUBCUTANEOUS EVERY 5 MIN PRN
Status: DISCONTINUED | OUTPATIENT
Start: 2021-04-14 | End: 2021-04-14

## 2021-04-14 RX ORDER — LIDOCAINE HYDROCHLORIDE 10 MG/ML
INJECTION, SOLUTION EPIDURAL; INFILTRATION; INTRACAUDAL; PERINEURAL AS NEEDED
Status: DISCONTINUED | OUTPATIENT
Start: 2021-04-14 | End: 2021-04-14 | Stop reason: SURG

## 2021-04-14 RX ORDER — SODIUM CHLORIDE, SODIUM LACTATE, POTASSIUM CHLORIDE, CALCIUM CHLORIDE 600; 310; 30; 20 MG/100ML; MG/100ML; MG/100ML; MG/100ML
INJECTION, SOLUTION INTRAVENOUS CONTINUOUS
Status: DISCONTINUED | OUTPATIENT
Start: 2021-04-14 | End: 2021-04-14

## 2021-04-14 RX ORDER — HYDROCODONE BITARTRATE AND ACETAMINOPHEN 5; 325 MG/1; MG/1
1 TABLET ORAL AS NEEDED
Status: COMPLETED | OUTPATIENT
Start: 2021-04-14 | End: 2021-04-14

## 2021-04-14 RX ORDER — ONDANSETRON 2 MG/ML
INJECTION INTRAMUSCULAR; INTRAVENOUS AS NEEDED
Status: DISCONTINUED | OUTPATIENT
Start: 2021-04-14 | End: 2021-04-14 | Stop reason: SURG

## 2021-04-14 RX ORDER — MORPHINE SULFATE 10 MG/ML
6 INJECTION, SOLUTION INTRAMUSCULAR; INTRAVENOUS EVERY 10 MIN PRN
Status: DISCONTINUED | OUTPATIENT
Start: 2021-04-14 | End: 2021-04-14

## 2021-04-14 RX ORDER — ACETAMINOPHEN 500 MG
1000 TABLET ORAL ONCE
Status: COMPLETED | OUTPATIENT
Start: 2021-04-14 | End: 2021-04-14

## 2021-04-14 RX ORDER — CEFAZOLIN SODIUM/WATER 2 G/20 ML
SYRINGE (ML) INTRAVENOUS AS NEEDED
Status: DISCONTINUED | OUTPATIENT
Start: 2021-04-14 | End: 2021-04-14 | Stop reason: SURG

## 2021-04-14 RX ORDER — HALOPERIDOL 5 MG/ML
0.25 INJECTION INTRAMUSCULAR ONCE AS NEEDED
Status: DISCONTINUED | OUTPATIENT
Start: 2021-04-14 | End: 2021-04-14

## 2021-04-14 RX ORDER — NALOXONE HYDROCHLORIDE 0.4 MG/ML
80 INJECTION, SOLUTION INTRAMUSCULAR; INTRAVENOUS; SUBCUTANEOUS AS NEEDED
Status: DISCONTINUED | OUTPATIENT
Start: 2021-04-14 | End: 2021-04-14

## 2021-04-14 RX ORDER — HYDROCODONE BITARTRATE AND ACETAMINOPHEN 5; 325 MG/1; MG/1
2 TABLET ORAL AS NEEDED
Status: COMPLETED | OUTPATIENT
Start: 2021-04-14 | End: 2021-04-14

## 2021-04-14 RX ORDER — HYDROMORPHONE HYDROCHLORIDE 1 MG/ML
0.4 INJECTION, SOLUTION INTRAMUSCULAR; INTRAVENOUS; SUBCUTANEOUS EVERY 5 MIN PRN
Status: DISCONTINUED | OUTPATIENT
Start: 2021-04-14 | End: 2021-04-14

## 2021-04-14 RX ORDER — BUPIVACAINE HYDROCHLORIDE AND EPINEPHRINE 2.5; 5 MG/ML; UG/ML
INJECTION, SOLUTION INFILTRATION; PERINEURAL AS NEEDED
Status: DISCONTINUED | OUTPATIENT
Start: 2021-04-14 | End: 2021-04-14 | Stop reason: HOSPADM

## 2021-04-14 RX ORDER — HYDROMORPHONE HYDROCHLORIDE 1 MG/ML
0.6 INJECTION, SOLUTION INTRAMUSCULAR; INTRAVENOUS; SUBCUTANEOUS EVERY 5 MIN PRN
Status: DISCONTINUED | OUTPATIENT
Start: 2021-04-14 | End: 2021-04-14

## 2021-04-14 RX ORDER — MIDAZOLAM HYDROCHLORIDE 1 MG/ML
INJECTION INTRAMUSCULAR; INTRAVENOUS AS NEEDED
Status: DISCONTINUED | OUTPATIENT
Start: 2021-04-14 | End: 2021-04-14 | Stop reason: SURG

## 2021-04-14 RX ORDER — METOPROLOL TARTRATE 5 MG/5ML
2.5 INJECTION INTRAVENOUS ONCE
Status: DISCONTINUED | OUTPATIENT
Start: 2021-04-14 | End: 2021-04-14

## 2021-04-14 RX ORDER — ROCURONIUM BROMIDE 10 MG/ML
INJECTION, SOLUTION INTRAVENOUS AS NEEDED
Status: DISCONTINUED | OUTPATIENT
Start: 2021-04-14 | End: 2021-04-14 | Stop reason: SURG

## 2021-04-14 RX ORDER — MORPHINE SULFATE 4 MG/ML
4 INJECTION, SOLUTION INTRAMUSCULAR; INTRAVENOUS EVERY 10 MIN PRN
Status: DISCONTINUED | OUTPATIENT
Start: 2021-04-14 | End: 2021-04-14

## 2021-04-14 RX ORDER — ONDANSETRON 2 MG/ML
4 INJECTION INTRAMUSCULAR; INTRAVENOUS EVERY 6 HOURS PRN
Status: CANCELLED | OUTPATIENT
Start: 2021-04-14

## 2021-04-14 RX ORDER — MORPHINE SULFATE 4 MG/ML
2 INJECTION, SOLUTION INTRAMUSCULAR; INTRAVENOUS EVERY 10 MIN PRN
Status: DISCONTINUED | OUTPATIENT
Start: 2021-04-14 | End: 2021-04-14

## 2021-04-14 RX ORDER — KETAMINE HYDROCHLORIDE 50 MG/ML
INJECTION, SOLUTION, CONCENTRATE INTRAMUSCULAR; INTRAVENOUS AS NEEDED
Status: DISCONTINUED | OUTPATIENT
Start: 2021-04-14 | End: 2021-04-14 | Stop reason: SURG

## 2021-04-14 RX ORDER — SCOLOPAMINE TRANSDERMAL SYSTEM 1 MG/1
1 PATCH, EXTENDED RELEASE TRANSDERMAL
Status: DISCONTINUED | OUTPATIENT
Start: 2021-04-14 | End: 2021-04-17 | Stop reason: HOSPADM

## 2021-04-14 RX ORDER — ONDANSETRON 2 MG/ML
4 INJECTION INTRAMUSCULAR; INTRAVENOUS ONCE AS NEEDED
Status: DISCONTINUED | OUTPATIENT
Start: 2021-04-14 | End: 2021-04-14

## 2021-04-14 RX ORDER — NEOSTIGMINE METHYLSULFATE 1 MG/ML
INJECTION INTRAVENOUS AS NEEDED
Status: DISCONTINUED | OUTPATIENT
Start: 2021-04-14 | End: 2021-04-14 | Stop reason: SURG

## 2021-04-14 RX ADMIN — KETAMINE HYDROCHLORIDE 50 MG: 50 INJECTION, SOLUTION, CONCENTRATE INTRAMUSCULAR; INTRAVENOUS at 09:26:00

## 2021-04-14 RX ADMIN — LIDOCAINE HYDROCHLORIDE 50 MG: 10 INJECTION, SOLUTION EPIDURAL; INFILTRATION; INTRACAUDAL; PERINEURAL at 09:22:00

## 2021-04-14 RX ADMIN — SODIUM CHLORIDE, SODIUM LACTATE, POTASSIUM CHLORIDE, CALCIUM CHLORIDE: 600; 310; 30; 20 INJECTION, SOLUTION INTRAVENOUS at 10:05:00

## 2021-04-14 RX ADMIN — ONDANSETRON 4 MG: 2 INJECTION INTRAMUSCULAR; INTRAVENOUS at 09:22:00

## 2021-04-14 RX ADMIN — MIDAZOLAM HYDROCHLORIDE 2 MG: 1 INJECTION INTRAMUSCULAR; INTRAVENOUS at 09:16:00

## 2021-04-14 RX ADMIN — SODIUM CHLORIDE, SODIUM LACTATE, POTASSIUM CHLORIDE, CALCIUM CHLORIDE: 600; 310; 30; 20 INJECTION, SOLUTION INTRAVENOUS at 11:00:00

## 2021-04-14 RX ADMIN — SODIUM CHLORIDE, SODIUM LACTATE, POTASSIUM CHLORIDE, CALCIUM CHLORIDE: 600; 310; 30; 20 INJECTION, SOLUTION INTRAVENOUS at 09:22:00

## 2021-04-14 RX ADMIN — ROCURONIUM BROMIDE 50 MG: 10 INJECTION, SOLUTION INTRAVENOUS at 09:22:00

## 2021-04-14 RX ADMIN — Medication 0.5 MG: at 10:15:00

## 2021-04-14 RX ADMIN — GLYCOPYRROLATE 0.8 MG: 0.2 INJECTION, SOLUTION INTRAMUSCULAR; INTRAVENOUS at 10:38:00

## 2021-04-14 RX ADMIN — DEXAMETHASONE SODIUM PHOSPHATE 4 MG: 4 MG/ML VIAL (ML) INJECTION at 09:22:00

## 2021-04-14 RX ADMIN — CEFAZOLIN SODIUM/WATER 2 G: 2 G/20 ML SYRINGE (ML) INTRAVENOUS at 09:36:00

## 2021-04-14 RX ADMIN — EPHEDRINE SULFATE 10 MG: 50 INJECTION, SOLUTION INTRAVENOUS at 09:35:00

## 2021-04-14 RX ADMIN — NEOSTIGMINE METHYLSULFATE 5 MG: 1 INJECTION INTRAVENOUS at 10:38:00

## 2021-04-14 RX ADMIN — EPHEDRINE SULFATE 10 MG: 50 INJECTION, SOLUTION INTRAVENOUS at 09:49:00

## 2021-04-14 NOTE — ANESTHESIA PROCEDURE NOTES
Airway  Urgency: elective    Airway not difficult    General Information and Staff    Patient location during procedure: OR  Anesthesiologist: Melva Ramirez MD  Resident/CRNA: Robina Santos CRNA    Indications and Patient Condition  Indicat

## 2021-04-14 NOTE — PLAN OF CARE
NEUROLOGICAL SURGERY & SPINE SURGERY      4/14/2021  8:55 AM     PRE-OPERATIVE CONSULTATION    Benjamín Aviles. was again informed of the nature of the problem, planned treatment, indications and alternatives.   We again reviewed the expected be

## 2021-04-14 NOTE — INTERVAL H&P NOTE
Pre-op Diagnosis: spondylosis with radiculopathy    The above referenced H&P was reviewed by Jimmy Chong MD on 4/14/2021, the patient was examined and no significant changes have occurred in the patient's condition since the H&P was performed.   I discusse

## 2021-04-14 NOTE — ANESTHESIA PREPROCEDURE EVALUATION
Anesthesia PreOp Note    HPI:     Vani Gold. is a 61year old male who presents for preoperative consultation requested by: Gera Gore MD    Date of Surgery: 4/14/2021    Procedure(s):  L4-5 laminectomy with left medial facetectomy and foraminotom joint         Date Noted: 09/18/2016        Past Medical History:   Diagnosis Date   • Calculus of kidney    • GERD (gastroesophageal reflux disease)    • High blood pressure    • PONV (postoperative nausea and vomiting)    • Ruptured hamstring tendon 2015 More than a month at Unknown time  Cholecalciferol (VITAMIN D3) 09404 UNITS Oral Tab, Take by mouth daily.   , Disp: , Rfl: , More than a month at Unknown time      lactated ringers infusion, , Intravenous, Continuous, Gato Alvarado MD, Last Rate: 20 mL/hr a Last Year:       Ran Out of Food in the Last Year:   Transportation Needs:       Lack of Transportation (Medical):       Lack of Transportation (Non-Medical):   Physical Activity:       Days of Exercise per Week:       Minutes of Exercise per Session:   [place] Airway   Mallampati: II  TM distance: >3 FB  Neck ROM: full  Comment: Small mouth opening  Dental          Pulmonary - negative ROS and normal exam   Cardiovascular - normal exam  Exercise tolerance: good (Pt states he still lifts weights, is able to ambul

## 2021-04-14 NOTE — BRIEF OP NOTE
[address] BRIEF OPERATIVE NOTE     Soni Rast.  Location: OR   [facility] [id number] MRN [id number]   Admission Date 4/14/2021 Operation Date 4/14/2021     Operating Physician Nunu Demarco MD        Preoperative Diagnosis:

## 2021-04-14 NOTE — ANESTHESIA POSTPROCEDURE EVALUATION
Patient: Kaylie Raymundo.     Procedure Summary     Date: 04/14/21 Room / Location: [address] [place] [address] MAIN OR 09 / [address] [place] [address] MAIN OR    Anesthesia Start: 1399 Anesthesia Stop: 1104    Procedure: L4-5 laminectomy with left medial facetectomy and foraminotomy for resection of e

## 2021-04-15 RX ORDER — GABAPENTIN 300 MG/1
CAPSULE ORAL
Qty: 90 CAPSULE | Refills: 1 | OUTPATIENT
Start: 2021-04-15

## 2021-04-15 NOTE — OPERATIVE REPORT
[facility]    PATIENT'S NAME: Nonnie Section   ATTENDING PHYSICIAN: Cherry Petty MD   OPERATING PHYSICIAN: Cherry Petty MD   PATIENT ACCOUNT#:   [id number]    LOCATION:  [facility] [address] PACU [address] [place] [address]  MEDICAL RECORD #:   [id number]       DATE OF BIRTH Outpatient Speech Language Pathology   Adult Speech Language Cognitive Progress Note       Patient Name: Berta Beatty  : 1966  MRN: [id number]  medial to the facet exerting pressure on the exiting L5 nerve root. Benefits and risks of the procedure were reviewed, discussed, documented in the chart. Consent was signed and we proceeded to the operating room on April 14, 2021.     OPERATIVE TECHNIQUE was quite erythematous here, suggesting the presence of inflammation. We irrigated it vigorously. Hemostasis was meticulously obtained. An upgoing curet was used to verify patency all the way out into the L5 foramen on that side.   Once that was accompli       SLP Plan    Frequency  24 visits    -BR    Duration  x12 weeks   -BR    Planned CPT's?  SLP INDIVIDUAL SPEECH THERAPY: 29484   -BR    Plan Comments  cont tx per POC   -BR      User Key  (r) = Recorded By, (t) = Taken By, (c) = Cosigned By    Initials Name Provider Type    Radha Box, CCC-SLP Speech and Language Pathologist            Radha Thompson CCC-SLP  1/19/2021

## 2021-04-30 ENCOUNTER — TELEPHONE (OUTPATIENT)
Dept: PAIN CLINIC | Facility: HOSPITAL | Age: 61
End: 2021-04-30

## 2021-04-30 RX ORDER — HYDROCODONE BITARTRATE AND ACETAMINOPHEN 10; 325 MG/1; MG/1
1 TABLET ORAL EVERY 8 HOURS PRN
Qty: 90 TABLET | Refills: 0 | Status: SHIPPED | OUTPATIENT
Start: 2021-04-30 | End: 2021-05-30

## 2021-05-21 RX ORDER — CELECOXIB 200 MG/1
200 CAPSULE ORAL 2 TIMES DAILY
Qty: 60 CAPSULE | Refills: 0 | OUTPATIENT
Start: 2021-05-21 | End: 2021-06-20

## 2021-05-21 RX ORDER — HYDROCODONE BITARTRATE AND ACETAMINOPHEN 10; 325 MG/1; MG/1
1 TABLET ORAL EVERY 6 HOURS PRN
Qty: 120 TABLET | Refills: 0 | Status: SHIPPED | OUTPATIENT
Start: 2021-05-21 | End: 2021-06-20

## 2021-05-21 NOTE — TELEPHONE ENCOUNTER
Requesting norco   S/p back surgery   Doing better with radicular pain  Will need to come in for a visit soon

## 2021-05-24 ENCOUNTER — TELEPHONE (OUTPATIENT)
Dept: PAIN CLINIC | Facility: HOSPITAL | Age: 61
End: 2021-05-24

## 2021-05-24 RX ORDER — CELECOXIB 100 MG/1
100 CAPSULE ORAL 2 TIMES DAILY
Qty: 180 CAPSULE | Refills: 0 | Status: CANCELLED | OUTPATIENT
Start: 2021-05-24 | End: 2021-08-22

## 2021-05-24 RX ORDER — CELECOXIB 100 MG/1
CAPSULE ORAL
Qty: 60 CAPSULE | Refills: 0 | Status: SHIPPED | OUTPATIENT
Start: 2021-05-24 | End: 2021-05-25

## 2021-05-25 ENCOUNTER — TELEPHONE (OUTPATIENT)
Dept: PAIN CLINIC | Facility: HOSPITAL | Age: 61
End: 2021-05-25

## 2021-05-26 RX ORDER — CELECOXIB 100 MG/1
100 CAPSULE ORAL 2 TIMES DAILY
Qty: 180 CAPSULE | Refills: 0 | Status: SHIPPED | OUTPATIENT
Start: 2021-05-26 | End: 2021-06-23

## 2021-06-24 ENCOUNTER — OFFICE VISIT (OUTPATIENT)
Dept: PAIN CLINIC | Facility: HOSPITAL | Age: 61
End: 2021-06-24
Attending: ANESTHESIOLOGY
Payer: COMMERCIAL

## 2021-06-24 VITALS
WEIGHT: 215 LBS | BODY MASS INDEX: 27.59 KG/M2 | HEART RATE: 60 BPM | SYSTOLIC BLOOD PRESSURE: 132 MMHG | DIASTOLIC BLOOD PRESSURE: 78 MMHG | RESPIRATION RATE: 18 BRPM | HEIGHT: 74 IN

## 2021-06-24 DIAGNOSIS — M25.512 CHRONIC LEFT SHOULDER PAIN: Primary | ICD-10-CM

## 2021-06-24 DIAGNOSIS — M54.40 CHRONIC LOW BACK PAIN WITH SCIATICA, SCIATICA LATERALITY UNSPECIFIED, UNSPECIFIED BACK PAIN LATERALITY: ICD-10-CM

## 2021-06-24 DIAGNOSIS — G57.93 NEUROPATHIC PAIN OF BOTH FEET: ICD-10-CM

## 2021-06-24 DIAGNOSIS — G89.29 CHRONIC LOW BACK PAIN WITH SCIATICA, SCIATICA LATERALITY UNSPECIFIED, UNSPECIFIED BACK PAIN LATERALITY: ICD-10-CM

## 2021-06-24 DIAGNOSIS — G89.29 CHRONIC LEFT SHOULDER PAIN: Primary | ICD-10-CM

## 2021-06-24 PROCEDURE — 20610 DRAIN/INJ JOINT/BURSA W/O US: CPT

## 2021-06-24 PROCEDURE — 99212 OFFICE O/P EST SF 10 MIN: CPT

## 2021-06-24 RX ORDER — BUPIVACAINE HYDROCHLORIDE 2.5 MG/ML
10 INJECTION, SOLUTION EPIDURAL; INFILTRATION; INTRACAUDAL ONCE
Status: ACTIVE | OUTPATIENT
Start: 2021-06-24

## 2021-06-24 RX ORDER — METHYLPREDNISOLONE ACETATE 40 MG/ML
40 INJECTION, SUSPENSION INTRA-ARTICULAR; INTRALESIONAL; INTRAMUSCULAR; SOFT TISSUE ONCE
Status: ACTIVE | OUTPATIENT
Start: 2021-06-24

## 2021-06-24 RX ORDER — HYDROCODONE BITARTRATE AND ACETAMINOPHEN 10; 325 MG/1; MG/1
1 TABLET ORAL EVERY 6 HOURS PRN
Qty: 120 TABLET | Refills: 0 | Status: SHIPPED | OUTPATIENT
Start: 2021-06-24 | End: 2021-07-24

## 2021-06-24 RX ORDER — HYDROCODONE BITARTRATE AND ACETAMINOPHEN 10; 325 MG/1; MG/1
1 TABLET ORAL EVERY 6 HOURS PRN
Qty: 120 TABLET | Refills: 0 | Status: SHIPPED | OUTPATIENT
Start: 2021-07-24 | End: 2021-08-23

## 2021-06-24 RX ORDER — HYDROCODONE BITARTRATE AND ACETAMINOPHEN 10; 325 MG/1; MG/1
1 TABLET ORAL EVERY 6 HOURS PRN
COMMUNITY
End: 2021-10-22

## 2021-06-24 RX ORDER — CELECOXIB 100 MG/1
100 CAPSULE ORAL 2 TIMES DAILY
Qty: 180 CAPSULE | Refills: 0 | Status: SHIPPED | OUTPATIENT
Start: 2021-08-24 | End: 2021-10-22

## 2021-06-24 RX ORDER — HYDROCODONE BITARTRATE AND ACETAMINOPHEN 10; 325 MG/1; MG/1
1 TABLET ORAL EVERY 6 HOURS PRN
COMMUNITY

## 2021-06-24 NOTE — CHRONIC PAIN
Follow-up Note    HISTORY OF PRESENT ILLNESS:  Soraya Atkins is a 61year old old male, returns to the clinic for evaluation his primary complaint today is increasing left shoulder pain as well has continued neuropathic type pain in his bilateral feet. SYSTEMS:   Bowel/Bladder Incontinence: as above  Coughing/sneezing/straining does not exacerbate the pain.   Numbness/tingling: as above  Weakness: as above  Weight Loss: Negative   Fever: Negative   Cardiovascular:  No current chest pain or palpitations Highest education level: Not on file    Occupational History      Not on file    Tobacco Use      Smoking status: Never Smoker      Smokeless tobacco: Never Used    Vaping Use      Vaping Use: Never used    Substance and Sexual Activity      Alcohol use: oriented x3  Affect:  NAD  CV: RRR, no m/r/g  Pulmonary: CTAB   Gait: Normal;    Spine: Normal  Upper extremities: Good palpable pulses bilaterally sensorimotor grossly intact bilaterally well-healed surgical scar left shoulder with 2 notable trigger point nerve damage. The patient desires the proposed injection as planned.     Total time 25 minutes  [place] for Pain Manegement  Pain Procedure Note    Procedure Type:  Trigger Point Injection  Prep:  Hand Washing, Time Out, Sterile Gloves  Site Prep:  Alcohol

## 2021-06-24 NOTE — PROGRESS NOTES
6/24/2021-presents ambulatory to [facility];  Medical management chronic lt shldr pain; Today pt c/o pain in bilateral feet;  States this has progressively gotten worse; rating pain 5-6/10;   Pt states he wakes up at night and u/a to ambulate d/u pain, nubness  I

## 2021-07-21 ENCOUNTER — LAB ENCOUNTER (OUTPATIENT)
Dept: LAB | Facility: HOSPITAL | Age: 61
End: 2021-07-21
Attending: UROLOGY
Payer: COMMERCIAL

## 2021-07-21 DIAGNOSIS — N20.0 KIDNEY STONE: Primary | ICD-10-CM

## 2021-07-21 DIAGNOSIS — R97.20 ELEVATED PSA: ICD-10-CM

## 2021-07-21 DIAGNOSIS — N18.9 CHRONIC RENAL INSUFFICIENCY: ICD-10-CM

## 2021-07-21 LAB
CREAT BLD-MCNC: 1.29 MG/DL
PSA FREE MFR SERPL: 9 %
PSA FREE SERPL-MCNC: 0.59 NG/ML
PSA SERPL-MCNC: 6.49 NG/ML (ref ?–4)

## 2021-07-21 PROCEDURE — 84154 ASSAY OF PSA FREE: CPT

## 2021-07-21 PROCEDURE — 36415 COLL VENOUS BLD VENIPUNCTURE: CPT

## 2021-07-21 PROCEDURE — 82565 ASSAY OF CREATININE: CPT

## 2021-07-21 PROCEDURE — 84153 ASSAY OF PSA TOTAL: CPT

## 2021-07-26 ENCOUNTER — TELEPHONE (OUTPATIENT)
Dept: PAIN CLINIC | Facility: HOSPITAL | Age: 61
End: 2021-07-26

## 2021-07-26 RX ORDER — HYDROCODONE BITARTRATE AND ACETAMINOPHEN 10; 325 MG/1; MG/1
1 TABLET ORAL EVERY 6 HOURS PRN
Qty: 120 TABLET | Refills: 0 | Status: SHIPPED | OUTPATIENT
Start: 2021-07-26 | End: 2021-08-25

## 2021-07-26 NOTE — TELEPHONE ENCOUNTER
Miguel Angel Zamudio continues to rehab from back surgery   Overall doing well requesting norco script   Been on 2-3 norco per day

## 2021-08-26 RX ORDER — GABAPENTIN 300 MG/1
CAPSULE ORAL
Qty: 30 CAPSULE | Refills: 0 | Status: SHIPPED | OUTPATIENT
Start: 2021-08-26 | End: 2021-09-23

## 2021-08-30 RX ORDER — CELECOXIB 200 MG/1
200 CAPSULE ORAL DAILY
Qty: 30 CAPSULE | Refills: 2 | Status: SHIPPED | OUTPATIENT
Start: 2021-08-30 | End: 2021-12-16

## 2021-08-30 RX ORDER — HYDROCODONE BITARTRATE AND ACETAMINOPHEN 10; 325 MG/1; MG/1
1 TABLET ORAL EVERY 6 HOURS PRN
Qty: 120 TABLET | Refills: 0 | Status: SHIPPED | OUTPATIENT
Start: 2021-08-30 | End: 2021-09-29

## 2021-08-30 NOTE — TELEPHONE ENCOUNTER
Lauren Henriquez today he is having some increased pain secondary to increased activity recently but overall relatively stable he still continues to use a small amount of Norco a day as well as Celebrex for pain control he will come in in a month or so to ge

## 2021-09-23 RX ORDER — GABAPENTIN 300 MG/1
CAPSULE ORAL
Qty: 30 CAPSULE | Refills: 0 | Status: SHIPPED | OUTPATIENT
Start: 2021-09-23 | End: 2021-10-21

## 2021-09-27 ENCOUNTER — TELEPHONE (OUTPATIENT)
Dept: PAIN CLINIC | Facility: HOSPITAL | Age: 61
End: 2021-09-27

## 2021-09-27 RX ORDER — HYDROCODONE BITARTRATE AND ACETAMINOPHEN 10; 325 MG/1; MG/1
1 TABLET ORAL EVERY 6 HOURS PRN
Qty: 120 TABLET | Refills: 0 | Status: SHIPPED | OUTPATIENT
Start: 2021-09-27 | End: 2021-10-27

## 2021-09-30 ENCOUNTER — LAB REQUISITION (OUTPATIENT)
Dept: LAB | Facility: HOSPITAL | Age: 61
End: 2021-09-30
Payer: COMMERCIAL

## 2021-09-30 DIAGNOSIS — R97.20 ELEVATED PROSTATE SPECIFIC ANTIGEN (PSA): ICD-10-CM

## 2021-09-30 PROCEDURE — 88305 TISSUE EXAM BY PATHOLOGIST: CPT | Performed by: UROLOGY

## 2021-10-07 ENCOUNTER — HOSPITAL ENCOUNTER (OUTPATIENT)
Dept: CT IMAGING | Age: 61
Discharge: HOME | End: 2021-10-07
Attending: UROLOGY
Payer: COMMERCIAL

## 2021-10-07 DIAGNOSIS — C61 PROSTATE CANCER (HCC): ICD-10-CM

## 2021-10-07 PROCEDURE — 74177 CT ABD & PELVIS W/CONTRAST: CPT | Performed by: UROLOGY

## 2021-10-07 PROCEDURE — 82565 ASSAY OF CREATININE: CPT

## 2021-10-11 ENCOUNTER — TELEPHONE (OUTPATIENT)
Dept: NEPHROLOGY | Facility: CLINIC | Age: 61
End: 2021-10-11

## 2021-10-21 ENCOUNTER — APPOINTMENT (OUTPATIENT)
Dept: URBAN - METROPOLITAN AREA CLINIC 321 | Age: 61
Setting detail: DERMATOLOGY
End: 2021-10-21

## 2021-10-21 DIAGNOSIS — B36.0 PITYRIASIS VERSICOLOR: ICD-10-CM

## 2021-10-21 DIAGNOSIS — D22 MELANOCYTIC NEVI: ICD-10-CM

## 2021-10-21 DIAGNOSIS — B07.8 OTHER VIRAL WARTS: ICD-10-CM

## 2021-10-21 PROBLEM — D22.9 MELANOCYTIC NEVI, UNSPECIFIED: Status: ACTIVE | Noted: 2021-10-21

## 2021-10-21 PROBLEM — D48.5 NEOPLASM OF UNCERTAIN BEHAVIOR OF SKIN: Status: ACTIVE | Noted: 2021-10-21

## 2021-10-21 PROCEDURE — OTHER PRESCRIPTION: OTHER

## 2021-10-21 PROCEDURE — 17110 DESTRUCT B9 LESION 1-14: CPT

## 2021-10-21 PROCEDURE — OTHER BIOPSY BY SHAVE METHOD: OTHER

## 2021-10-21 PROCEDURE — OTHER COUNSELING: OTHER

## 2021-10-21 PROCEDURE — OTHER LIQUID NITROGEN: OTHER

## 2021-10-21 PROCEDURE — 11102 TANGNTL BX SKIN SINGLE LES: CPT | Mod: 59

## 2021-10-21 PROCEDURE — 99213 OFFICE O/P EST LOW 20 MIN: CPT | Mod: 25

## 2021-10-21 RX ORDER — KETOCONAZOLE 20 MG/G
CREAM TOPICAL BID
Qty: 60 | Refills: 10 | Status: ERX

## 2021-10-21 RX ORDER — GABAPENTIN 300 MG/1
CAPSULE ORAL
Qty: 30 CAPSULE | Refills: 0 | Status: SHIPPED | OUTPATIENT
Start: 2021-10-21

## 2021-10-21 RX ORDER — KETOCONAZOLE 20 MG/ML
SHAMPOO, SUSPENSION TOPICAL BID
Qty: 120 | Refills: 10 | Status: ERX

## 2021-10-21 RX ORDER — FLUCONAZOLE 150 MG/1
TABLET ORAL
Qty: 4 | Refills: 4 | Status: ERX

## 2021-10-21 ASSESSMENT — LOCATION DETAILED DESCRIPTION DERM
LOCATION DETAILED: LEFT LATERAL MANDIBULAR CHEEK
LOCATION DETAILED: RIGHT SUPERIOR TEMPLE
LOCATION DETAILED: LEFT DISTAL DORSAL FOREARM
LOCATION DETAILED: LEFT CLAVICULAR SKIN
LOCATION DETAILED: RIGHT SUPERIOR MEDIAL UPPER BACK
LOCATION DETAILED: RIGHT SUPERIOR LATERAL MALAR CHEEK
LOCATION DETAILED: LEFT INFERIOR LATERAL FOREHEAD

## 2021-10-21 ASSESSMENT — LOCATION ZONE DERM
LOCATION ZONE: TRUNK
LOCATION ZONE: ARM
LOCATION ZONE: FACE

## 2021-10-21 ASSESSMENT — LOCATION SIMPLE DESCRIPTION DERM
LOCATION SIMPLE: LEFT FOREARM
LOCATION SIMPLE: LEFT CHEEK
LOCATION SIMPLE: LEFT CLAVICULAR SKIN
LOCATION SIMPLE: RIGHT UPPER BACK
LOCATION SIMPLE: RIGHT TEMPLE
LOCATION SIMPLE: LEFT FOREHEAD
LOCATION SIMPLE: RIGHT CHEEK

## 2021-10-21 NOTE — PROCEDURE: BIOPSY BY SHAVE METHOD
X Size Of Lesion In Cm: 0
Validate Anticipated Plan: No
Biopsy Type: H and E
Post-Care Instructions: I reviewed with the patient in detail post-care instructions. Patient is to keep the biopsy site dry overnight, and then apply Petrolatum twice daily until healed, or after a night or two leave area open and dry overnight and a scab will form which will fall off on its own in a few weeks.
Wound Care: Petrolatum
Information: Selecting Yes will display possible errors in your note based on the variables you have selected. This validation is only offered as a suggestion for you. PLEASE NOTE THAT THE VALIDATION TEXT WILL BE REMOVED WHEN YOU FINALIZE YOUR NOTE. IF YOU WANT TO FAX A PRELIMINARY NOTE YOU WILL NEED TO TOGGLE THIS TO 'NO' IF YOU DO NOT WANT IT IN YOUR FAXED NOTE.
Dressing: bandage
Electrodesiccation And Curettage Text: The wound bed was treated with electrodesiccation and curettage after the biopsy was performed.
Silver Nitrate Text: The wound bed was treated with silver nitrate after the biopsy was performed.
Depth Of Biopsy: dermis
Billing Type: Third-Party Bill
Curettage Text: The wound bed was treated with curettage after the biopsy was performed.
Cryotherapy Text: The wound bed was treated with cryotherapy after the biopsy was performed.
Electrodesiccation Text: The wound bed was treated with electrodesiccation after the biopsy was performed.
Detail Level: Detailed
Anesthesia Volume In Cc (Will Not Render If 0): 0.5
Anesthesia Type: 1% lidocaine with epinephrine and a 1:10 solution of 8.4% sodium bicarbonate
Biopsy Method: Dermablade
Was A Bandage Applied: Yes
Hemostasis: Drysol
Notification Instructions: Patient will be notified of biopsy results. However, patient instructed to call the office if not contacted within 2 weeks.
Consent: Written consent was obtained and risks were reviewed including but not limited to scarring, infection, bleeding, scabbing, incomplete removal, nerve damage and allergy to anesthesia.
Type Of Destruction Used: Curettage

## 2021-10-21 NOTE — PROCEDURE: LIQUID NITROGEN
Render In Bullet Format When Appropriate: No
Medical Necessity Clause: This procedure was medically necessary because the lesions that were treated were:
Duration Of Freeze Thaw-Cycle (Seconds): 0
Render Post Care In The Note?: yes
Consent: The patient's consent was obtained including but not limited to risks of crusting, scabbing, blistering, scarring, darker or lighter pigmentary change, recurrence, incomplete removal and infection.
Medical Necessity Information: It is in your best interest to select a reason for this procedure from the list below. All of these items fulfill various CMS LCD requirements except the new and changing color options.
Detail Level: Zone
Total Number Of Lesions Treated: 5
Post-Care Instructions: I reviewed with the patient in detail post-care instructions. Patient is to wear sunprotection, and avoid picking at any of the treated lesions. Pt may apply Vaseline to crusted or scabbing areas.

## 2021-10-22 ENCOUNTER — OFFICE VISIT (OUTPATIENT)
Dept: NEPHROLOGY | Facility: CLINIC | Age: 61
End: 2021-10-22
Payer: COMMERCIAL

## 2021-10-22 VITALS
HEART RATE: 74 BPM | BODY MASS INDEX: 28.36 KG/M2 | SYSTOLIC BLOOD PRESSURE: 126 MMHG | DIASTOLIC BLOOD PRESSURE: 80 MMHG | TEMPERATURE: 98 F | HEIGHT: 74 IN | WEIGHT: 221 LBS

## 2021-10-22 DIAGNOSIS — M25.561 CHRONIC PAIN OF RIGHT KNEE: ICD-10-CM

## 2021-10-22 DIAGNOSIS — M54.50 CHRONIC LOW BACK PAIN WITHOUT SCIATICA, UNSPECIFIED BACK PAIN LATERALITY: ICD-10-CM

## 2021-10-22 DIAGNOSIS — M75.42 IMPINGEMENT SYNDROME OF LEFT SHOULDER: ICD-10-CM

## 2021-10-22 DIAGNOSIS — N18.31 STAGE 3A CHRONIC KIDNEY DISEASE (HCC): ICD-10-CM

## 2021-10-22 DIAGNOSIS — G89.29 CHRONIC LOW BACK PAIN WITHOUT SCIATICA, UNSPECIFIED BACK PAIN LATERALITY: ICD-10-CM

## 2021-10-22 DIAGNOSIS — E53.8 B12 DEFICIENCY: ICD-10-CM

## 2021-10-22 DIAGNOSIS — G89.29 CHRONIC PAIN OF RIGHT KNEE: ICD-10-CM

## 2021-10-22 DIAGNOSIS — R60.0 LOCALIZED EDEMA: ICD-10-CM

## 2021-10-22 DIAGNOSIS — R61 CHRONIC NIGHT SWEATS: Primary | ICD-10-CM

## 2021-10-22 PROCEDURE — 3008F BODY MASS INDEX DOCD: CPT | Performed by: INTERNAL MEDICINE

## 2021-10-22 PROCEDURE — 3074F SYST BP LT 130 MM HG: CPT | Performed by: INTERNAL MEDICINE

## 2021-10-22 PROCEDURE — 3079F DIAST BP 80-89 MM HG: CPT | Performed by: INTERNAL MEDICINE

## 2021-10-22 RX ORDER — PREGABALIN 50 MG/1
50 CAPSULE ORAL 2 TIMES DAILY
Qty: 60 CAPSULE | Refills: 3 | Status: SHIPPED | OUTPATIENT
Start: 2021-10-22 | End: 2021-10-25

## 2021-10-22 NOTE — PATIENT INSTRUCTIONS
Hold gabapentin try Lyrica to 3 times a day      Please do x-ray of chest please do full set of labs they should be fasting      Please do calcium CT score all you need to do is call the hospital to schedule this is also called \"a heart check\"  It should

## 2021-10-23 NOTE — PROGRESS NOTES
Dear Rosa Nichols.     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 [place] was recently diagnosed by prostate cancer by biopsy his PSA was 5.4 with a free PSA of 12%  He is still CATARACT SURGERY, COMPLEX Bilateral       Social History    Tobacco Use      Smoking status: Never Smoker      Smokeless tobacco: Never Used    Alcohol use:  Yes      Alcohol/week: 1.0 standard drink      Types: 1 Glasses of wine per week      Comment: 1 gl for chest pain, sob  Respiratory:  Negative for cough, dyspnea and wheezing  Gastrointestinal:  Negative for abdominal pain, constipation  Genitourinary: Frequent urination but no hematuria or discharge or pain  Endocrine:  Negative for abnormal sleep maureen     Smoking Tobacco Use: Never Smoker      Smokeless Tobacco Use: Never Used rule out tb  Check xray of chest    5. Prostate ca  Discuss w dr Bahena Blades proper modalilty  likey prostatectomy vs seeds  6. htn  conitnue hydrochlorothiazide. Bryce Roughen  amlodipine  Check

## 2021-10-24 ENCOUNTER — TELEPHONE (OUTPATIENT)
Dept: NEPHROLOGY | Facility: CLINIC | Age: 61
End: 2021-10-24

## 2021-10-25 RX ORDER — PREGABALIN 50 MG/1
50 CAPSULE ORAL 2 TIMES DAILY
Qty: 60 CAPSULE | Refills: 3 | Status: SHIPPED | OUTPATIENT
Start: 2021-10-25

## 2021-10-25 NOTE — TELEPHONE ENCOUNTER
Pt requesting refill on lyrica med, he is going out of town. LOV 10/22/21. RTC in 4 weeks. Apt booked for 12/23. Medication noted as taking.

## 2021-10-25 NOTE — TELEPHONE ENCOUNTER
Pt would like RX sent to [facility] on [address]. Pt is going out of town so needs RX as soon as possible. Please call.

## 2021-11-05 ENCOUNTER — TELEPHONE (OUTPATIENT)
Dept: PAIN CLINIC | Facility: HOSPITAL | Age: 61
End: 2021-11-05

## 2021-11-05 RX ORDER — HYDROCODONE BITARTRATE AND ACETAMINOPHEN 10; 325 MG/1; MG/1
1 TABLET ORAL EVERY 6 HOURS PRN
Qty: 120 TABLET | Refills: 0 | Status: SHIPPED | OUTPATIENT
Start: 2021-11-05 | End: 2021-12-05
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
## 2021-11-06 ENCOUNTER — HOSPITAL ENCOUNTER (OUTPATIENT)
Dept: GENERAL RADIOLOGY | Facility: HOSPITAL | Age: 61
Discharge: HOME | End: 2021-11-06
Attending: INTERNAL MEDICINE
Payer: COMMERCIAL

## 2021-11-06 ENCOUNTER — LAB ENCOUNTER (OUTPATIENT)
Dept: LAB | Facility: HOSPITAL | Age: 61
End: 2021-11-06
Attending: INTERNAL MEDICINE
Payer: COMMERCIAL

## 2021-11-06 DIAGNOSIS — R60.0 LOCALIZED EDEMA: ICD-10-CM

## 2021-11-06 DIAGNOSIS — R61 CHRONIC NIGHT SWEATS: ICD-10-CM

## 2021-11-06 DIAGNOSIS — E53.8 B12 DEFICIENCY: ICD-10-CM

## 2021-11-06 DIAGNOSIS — M54.50 CHRONIC LOW BACK PAIN WITHOUT SCIATICA, UNSPECIFIED BACK PAIN LATERALITY: ICD-10-CM

## 2021-11-06 DIAGNOSIS — G89.29 CHRONIC LOW BACK PAIN WITHOUT SCIATICA, UNSPECIFIED BACK PAIN LATERALITY: ICD-10-CM

## 2021-11-06 DIAGNOSIS — G89.29 CHRONIC PAIN OF RIGHT KNEE: ICD-10-CM

## 2021-11-06 DIAGNOSIS — M25.561 CHRONIC PAIN OF RIGHT KNEE: ICD-10-CM

## 2021-11-06 DIAGNOSIS — N18.31 STAGE 3A CHRONIC KIDNEY DISEASE (HCC): ICD-10-CM

## 2021-11-06 DIAGNOSIS — M75.42 IMPINGEMENT SYNDROME OF LEFT SHOULDER: ICD-10-CM

## 2021-11-06 PROCEDURE — 80053 COMPREHEN METABOLIC PANEL: CPT

## 2021-11-06 PROCEDURE — 82607 VITAMIN B-12: CPT

## 2021-11-06 PROCEDURE — 84165 PROTEIN E-PHORESIS SERUM: CPT

## 2021-11-06 PROCEDURE — 82306 VITAMIN D 25 HYDROXY: CPT

## 2021-11-06 PROCEDURE — 81003 URINALYSIS AUTO W/O SCOPE: CPT

## 2021-11-06 PROCEDURE — 82784 ASSAY IGA/IGD/IGG/IGM EACH: CPT

## 2021-11-06 PROCEDURE — 85652 RBC SED RATE AUTOMATED: CPT

## 2021-11-06 PROCEDURE — 86480 TB TEST CELL IMMUN MEASURE: CPT

## 2021-11-06 PROCEDURE — 85025 COMPLETE CBC W/AUTO DIFF WBC: CPT

## 2021-11-06 PROCEDURE — 80061 LIPID PANEL: CPT

## 2021-11-06 PROCEDURE — 86334 IMMUNOFIX E-PHORESIS SERUM: CPT

## 2021-11-06 PROCEDURE — 83036 HEMOGLOBIN GLYCOSYLATED A1C: CPT

## 2021-11-06 PROCEDURE — 86140 C-REACTIVE PROTEIN: CPT

## 2021-11-06 PROCEDURE — 71046 X-RAY EXAM CHEST 2 VIEWS: CPT | Performed by: INTERNAL MEDICINE

## 2021-11-06 PROCEDURE — 36415 COLL VENOUS BLD VENIPUNCTURE: CPT

## 2021-11-09 ENCOUNTER — PATIENT MESSAGE (OUTPATIENT)
Dept: NEPHROLOGY | Facility: CLINIC | Age: 61
End: 2021-11-09

## 2021-11-09 ENCOUNTER — LAB ENCOUNTER (OUTPATIENT)
Dept: LAB | Facility: HOSPITAL | Age: 61
End: 2021-11-09
Attending: INTERNAL MEDICINE
Payer: COMMERCIAL

## 2021-11-09 DIAGNOSIS — N18.31 STAGE 3A CHRONIC KIDNEY DISEASE (HCC): ICD-10-CM

## 2021-11-09 DIAGNOSIS — M75.42 IMPINGEMENT SYNDROME OF LEFT SHOULDER: ICD-10-CM

## 2021-11-09 DIAGNOSIS — E53.8 B12 DEFICIENCY: ICD-10-CM

## 2021-11-09 DIAGNOSIS — R60.0 LOCALIZED EDEMA: ICD-10-CM

## 2021-11-09 DIAGNOSIS — G89.29 CHRONIC LOW BACK PAIN WITHOUT SCIATICA, UNSPECIFIED BACK PAIN LATERALITY: ICD-10-CM

## 2021-11-09 DIAGNOSIS — R61 CHRONIC NIGHT SWEATS: ICD-10-CM

## 2021-11-09 DIAGNOSIS — G89.29 CHRONIC PAIN OF RIGHT KNEE: ICD-10-CM

## 2021-11-09 DIAGNOSIS — M25.561 CHRONIC PAIN OF RIGHT KNEE: ICD-10-CM

## 2021-11-09 DIAGNOSIS — M54.50 CHRONIC LOW BACK PAIN WITHOUT SCIATICA, UNSPECIFIED BACK PAIN LATERALITY: ICD-10-CM

## 2021-11-09 PROCEDURE — 82570 ASSAY OF URINE CREATININE: CPT

## 2021-11-09 PROCEDURE — 84166 PROTEIN E-PHORESIS/URINE/CSF: CPT

## 2021-11-09 PROCEDURE — 86335 IMMUNFIX E-PHORSIS/URINE/CSF: CPT

## 2021-11-09 PROCEDURE — 84156 ASSAY OF PROTEIN URINE: CPT

## 2021-11-09 PROCEDURE — 82043 UR ALBUMIN QUANTITATIVE: CPT

## 2021-11-09 NOTE — TELEPHONE ENCOUNTER
Called pharmacy multiple times today and was put on hold. Unable to reach a pharmacist.    Left detailed message to call back on confidential voicemail to confirm medication that was signed by Dr. Zuleyka Cooley.

## 2021-11-09 NOTE — TELEPHONE ENCOUNTER
From: Isrrael Clark. To: Terri Joyner. Janet Jacobs MD  Sent: 11/9/2021 6:53 AM CST  Subject: Urgent please respond    Please respond! Hi Dr. Janet Jacobs, this is Dr. Hafsa Awad wife, Mary Joshua.  I'm following up on the Lyrica medication that you prescribed weeks ago to my hu

## 2021-11-10 ENCOUNTER — PATIENT MESSAGE (OUTPATIENT)
Dept: NEPHROLOGY | Facility: CLINIC | Age: 61
End: 2021-11-10

## 2021-11-10 NOTE — TELEPHONE ENCOUNTER
From: Ian Broderick. To: Norman Navas. Marquise Car MD  Sent: 11/9/2021 6:53 AM CST  Subject: Urgent please respond    Please respond! Hi Dr. Marquise Car, this is Dr. Tammy Archer wife, Cesar Henderson.  I'm following up on the Lyrica medication that you prescribed weeks ago to my hu

## 2021-11-17 ENCOUNTER — DOCUMENTATION ONLY (OUTPATIENT)
Dept: PAIN CLINIC | Facility: HOSPITAL | Age: 61
End: 2021-11-17

## 2021-11-17 NOTE — PROGRESS NOTES
Procedure code [id number]    [facility]--- called [facility] at 11:35am   Spoke with Musa vázquez over the phone , case approved  auth # [id number]  Valid from 11/19/2021--12/18/21

## 2021-11-21 ENCOUNTER — PATIENT MESSAGE (OUTPATIENT)
Dept: NEPHROLOGY | Facility: CLINIC | Age: 61
End: 2021-11-21

## 2021-11-30 RX ORDER — HYDROCODONE BITARTRATE AND ACETAMINOPHEN 10; 325 MG/1; MG/1
1 TABLET ORAL EVERY 6 HOURS PRN
Qty: 120 TABLET | Refills: 0 | Status: SHIPPED | OUTPATIENT
Start: 2021-11-30 | End: 2021-12-30

## 2021-11-30 NOTE — TELEPHONE ENCOUNTER
Berlin Rubio out of norco  11/6 last script   no change from haile last week   Wants anotjher haile   Will renew norco and set up injection

## 2021-12-16 RX ORDER — CELECOXIB 200 MG/1
200 CAPSULE ORAL DAILY
Qty: 30 CAPSULE | Refills: 1 | Status: SHIPPED | OUTPATIENT
Start: 2021-12-16 | End: 2022-03-16

## 2022-01-06 ENCOUNTER — APPOINTMENT (OUTPATIENT)
Dept: MRI IMAGING | Facility: HOSPITAL | Age: 62
End: 2022-01-06
Attending: EMERGENCY MEDICINE
Payer: COMMERCIAL

## 2022-01-06 ENCOUNTER — TELEPHONE (OUTPATIENT)
Dept: PAIN CLINIC | Facility: HOSPITAL | Age: 62
End: 2022-01-06

## 2022-01-06 ENCOUNTER — APPOINTMENT (OUTPATIENT)
Dept: CT IMAGING | Facility: HOSPITAL | Age: 62
End: 2022-01-06
Attending: EMERGENCY MEDICINE
Payer: COMMERCIAL

## 2022-01-06 ENCOUNTER — PROCEDURE VISIT (OUTPATIENT)
Dept: NEUROLOGY | Facility: CLINIC | Age: 62
End: 2022-01-06
Payer: COMMERCIAL

## 2022-01-06 ENCOUNTER — HOSPITAL ENCOUNTER (EMERGENCY)
Facility: HOSPITAL | Age: 62
Discharge: HOME | End: 2022-01-06
Attending: EMERGENCY MEDICINE
Payer: COMMERCIAL

## 2022-01-06 VITALS
RESPIRATION RATE: 17 BRPM | DIASTOLIC BLOOD PRESSURE: 76 MMHG | HEIGHT: 74 IN | TEMPERATURE: 99 F | SYSTOLIC BLOOD PRESSURE: 112 MMHG | WEIGHT: 225 LBS | OXYGEN SATURATION: 99 % | BODY MASS INDEX: 28.88 KG/M2 | HEART RATE: 62 BPM

## 2022-01-06 DIAGNOSIS — G44.201 ACUTE INTRACTABLE TENSION-TYPE HEADACHE: ICD-10-CM

## 2022-01-06 DIAGNOSIS — U07.1 COVID-19: Primary | ICD-10-CM

## 2022-01-06 LAB
ANION GAP SERPL CALC-SCNC: 4 MMOL/L (ref 0–18)
BASOPHILS # BLD AUTO: 0.02 X10(3) UL (ref 0–0.2)
BASOPHILS NFR BLD AUTO: 0.3 %
BUN BLD-MCNC: 19 MG/DL (ref 7–18)
BUN/CREAT SERPL: 12.8 (ref 10–20)
CALCIUM BLD-MCNC: 9 MG/DL (ref 8.5–10.1)
CHLORIDE SERPL-SCNC: 102 MMOL/L (ref 98–112)
CO2 SERPL-SCNC: 29 MMOL/L (ref 21–32)
CREAT BLD-MCNC: 1.48 MG/DL
DEPRECATED RDW RBC AUTO: 50.2 FL (ref 35.1–46.3)
EOSINOPHIL # BLD AUTO: 0.01 X10(3) UL (ref 0–0.7)
EOSINOPHIL NFR BLD AUTO: 0.2 %
ERYTHROCYTE [DISTWIDTH] IN BLOOD BY AUTOMATED COUNT: 15 % (ref 11–15)
GLUCOSE BLD-MCNC: 109 MG/DL (ref 70–99)
HCT VFR BLD AUTO: 44.2 %
HGB BLD-MCNC: 15 G/DL
IMM GRANULOCYTES # BLD AUTO: 0.04 X10(3) UL (ref 0–1)
IMM GRANULOCYTES NFR BLD: 0.7 %
LYMPHOCYTES # BLD AUTO: 1.24 X10(3) UL (ref 1–4)
LYMPHOCYTES NFR BLD AUTO: 20.3 %
MCH RBC QN AUTO: 30.7 PG (ref 26–34)
MCHC RBC AUTO-ENTMCNC: 33.9 G/DL (ref 31–37)
MCV RBC AUTO: 90.6 FL
MONOCYTES # BLD AUTO: 0.66 X10(3) UL (ref 0.1–1)
MONOCYTES NFR BLD AUTO: 10.8 %
NEUTROPHILS # BLD AUTO: 4.14 X10 (3) UL (ref 1.5–7.7)
NEUTROPHILS # BLD AUTO: 4.14 X10(3) UL (ref 1.5–7.7)
NEUTROPHILS NFR BLD AUTO: 67.7 %
OSMOLALITY SERPL CALC.SUM OF ELEC: 283 MOSM/KG (ref 275–295)
PLATELET # BLD AUTO: 141 10(3)UL (ref 150–450)
POTASSIUM SERPL-SCNC: 4.5 MMOL/L (ref 3.5–5.1)
RBC # BLD AUTO: 4.88 X10(6)UL
SARS-COV-2 RNA RESP QL NAA+PROBE: DETECTED
SODIUM SERPL-SCNC: 135 MMOL/L (ref 136–145)
WBC # BLD AUTO: 6.1 X10(3) UL (ref 4–11)

## 2022-01-06 PROCEDURE — 95909 NRV CNDJ TST 5-6 STUDIES: CPT | Performed by: OTHER

## 2022-01-06 PROCEDURE — 96365 THER/PROPH/DIAG IV INF INIT: CPT

## 2022-01-06 PROCEDURE — 99285 EMERGENCY DEPT VISIT HI MDM: CPT

## 2022-01-06 PROCEDURE — 85025 COMPLETE CBC W/AUTO DIFF WBC: CPT | Performed by: EMERGENCY MEDICINE

## 2022-01-06 PROCEDURE — 70551 MRI BRAIN STEM W/O DYE: CPT | Performed by: EMERGENCY MEDICINE

## 2022-01-06 PROCEDURE — 70498 CT ANGIOGRAPHY NECK: CPT | Performed by: EMERGENCY MEDICINE

## 2022-01-06 PROCEDURE — 70496 CT ANGIOGRAPHY HEAD: CPT | Performed by: EMERGENCY MEDICINE

## 2022-01-06 PROCEDURE — 96361 HYDRATE IV INFUSION ADD-ON: CPT

## 2022-01-06 PROCEDURE — 96376 TX/PRO/DX INJ SAME DRUG ADON: CPT

## 2022-01-06 PROCEDURE — 96375 TX/PRO/DX INJ NEW DRUG ADDON: CPT

## 2022-01-06 PROCEDURE — 80048 BASIC METABOLIC PNL TOTAL CA: CPT | Performed by: EMERGENCY MEDICINE

## 2022-01-06 PROCEDURE — 95886 MUSC TEST DONE W/N TEST COMP: CPT | Performed by: OTHER

## 2022-01-06 RX ORDER — CELECOXIB 200 MG/1
200 CAPSULE ORAL DAILY
Qty: 30 CAPSULE | Refills: 0 | Status: SHIPPED | OUTPATIENT
Start: 2022-01-06 | End: 2022-02-05

## 2022-01-06 RX ORDER — DIPHENHYDRAMINE HYDROCHLORIDE 50 MG/ML
25 INJECTION INTRAMUSCULAR; INTRAVENOUS ONCE
Status: COMPLETED | OUTPATIENT
Start: 2022-01-06 | End: 2022-01-06

## 2022-01-06 RX ORDER — HYDROCODONE BITARTRATE AND ACETAMINOPHEN 10; 325 MG/1; MG/1
1 TABLET ORAL EVERY 6 HOURS PRN
Qty: 120 TABLET | Refills: 0 | Status: SHIPPED | OUTPATIENT
Start: 2022-01-06 | End: 2022-02-05

## 2022-01-06 RX ORDER — DIPHENHYDRAMINE HYDROCHLORIDE 50 MG/ML
12.5 INJECTION INTRAMUSCULAR; INTRAVENOUS ONCE
Status: COMPLETED | OUTPATIENT
Start: 2022-01-06 | End: 2022-01-06

## 2022-01-06 RX ORDER — METOCLOPRAMIDE HYDROCHLORIDE 5 MG/ML
10 INJECTION INTRAMUSCULAR; INTRAVENOUS ONCE
Status: COMPLETED | OUTPATIENT
Start: 2022-01-06 | End: 2022-01-06

## 2022-01-06 RX ORDER — DEXAMETHASONE SODIUM PHOSPHATE 10 MG/ML
10 INJECTION, SOLUTION INTRAMUSCULAR; INTRAVENOUS ONCE
Status: COMPLETED | OUTPATIENT
Start: 2022-01-06 | End: 2022-01-06

## 2022-01-06 RX ORDER — KETOROLAC TROMETHAMINE 30 MG/ML
30 INJECTION, SOLUTION INTRAMUSCULAR; INTRAVENOUS ONCE
Status: COMPLETED | OUTPATIENT
Start: 2022-01-06 | End: 2022-01-06

## 2022-01-06 RX ORDER — AMLODIPINE BESYLATE 5 MG/1
5 TABLET ORAL DAILY
COMMUNITY

## 2022-01-06 RX ORDER — CAFFEINE 200 MG
200 TABLET ORAL ONCE
Status: COMPLETED | OUTPATIENT
Start: 2022-01-06 | End: 2022-01-06

## 2022-01-06 RX ORDER — HALOPERIDOL 5 MG/ML
5 INJECTION INTRAMUSCULAR ONCE
Status: COMPLETED | OUTPATIENT
Start: 2022-01-06 | End: 2022-01-06

## 2022-01-06 NOTE — TELEPHONE ENCOUNTER
Pt waiting for epidural approval  Ns said rec surgery   Wants another ep[idural  Will need to come in for visit  Requesting refil on norco and celebrex

## 2022-01-06 NOTE — PROCEDURES
HISTORY:    Alexy Salas. is a 64year old male with a complaint of shoulder pain, but also neck pain controlling numbness from the upper arm, foot deformity today thumb area on the left side.     PHYSICAL:    Cranial nerves grossly normal. Motor examin - APB      Wrist APB 3.85 6.3 100 7.50 Wrist - APB 8        Elbow APB 9.32 5.5 86.8 8.85 Elbow - Wrist 27 5.47 49   L Ulnar - ADM      Wrist ADM 3.70 7.7 100 5.99 Wrist - ADM 8        B. Elbow ADM 8.18 6.2 79.8 7.34 B. Elbow - Wrist 22 4.48 49      A. 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 CLINICAL CORRELATION:    These abnormal findings could account for the patient's clinical complaints on peripheral nerve or muscle basis.     Lobo Sepulveda MD

## 2022-01-06 NOTE — ED INITIAL ASSESSMENT (HPI)
Patient here today with headache and neck pain. Patient states he needs cervical fusion of C3-C6. Patient states he was also hypertensive at home. Patient states he has been in contacted with his doctor and adjusting his bp medications.

## 2022-01-07 NOTE — ED PROVIDER NOTES
Patient Seen in: [facility] Emergency Department      History   Patient presents with:  Headache    Stated Complaint: headache    Subjective:   HPI    60-year-old male presents for evaluation of headache.   Patient reports for the past 4 days he ha Constitutional:       General: He is not in acute distress. Appearance: He is well-developed. HENT:      Head: Normocephalic and atraumatic. Eyes:      Extraocular Movements: Extraocular movements intact.       Conjunctiva/sclera: Conjunctivae nor Platelet.   Procedure                               Abnormality         Status                     ---------                               -----------         ------                     CBC W/ DIFFERENTIAL[[id number]]          Abnormal            Final resul MEDIASTINUM/APICES (Limited): No mass or adenopathy. OTHER: There are moderate spondylotic changes in the cervical spine     resulting reversal the normal cervical lordosis.                        =====    CONCLUSION:     1.  No flow limiting stenosis o visible lesion. SINUSES: Limited views demonstrate no significant mucosal thickening or     fluid. ORBITS: Changes of prior cataract surgery are noted bilaterally. OTHER: Negative.            CT ANGIOGRAPHY OF THE BRAIN:         ANTERIOR C 1/6/22 1643)   iopamidol (ISOVUE-370) 76 % injection 50 mL (50 mL Intravenous Given 1/6/22 1659)   iopamidol (ISOVUE-370) 76 % injection 50 mL (50 mL Intravenous Given 1/6/22 1659)   ketorolac (TORADOL) 30 MG/ML injection 30 mg (30 mg Intravenous Given 1/6 MRI.  Patient's COVID testing is positive, discussed this with the patient. If MRI is without any acute findings plan for discharge home with supportive care, outpatient follow-up as needed as well as return precautions.   Discussed this with patient who v

## 2022-01-20 ENCOUNTER — OFFICE VISIT (OUTPATIENT)
Dept: NEUROLOGY | Facility: CLINIC | Age: 62
End: 2022-01-20
Payer: COMMERCIAL

## 2022-01-20 ENCOUNTER — LAB ENCOUNTER (OUTPATIENT)
Dept: LAB | Facility: HOSPITAL | Age: 62
End: 2022-01-20
Attending: INTERNAL MEDICINE
Payer: COMMERCIAL

## 2022-01-20 VITALS
HEART RATE: 51 BPM | HEIGHT: 73 IN | WEIGHT: 210 LBS | BODY MASS INDEX: 27.83 KG/M2 | SYSTOLIC BLOOD PRESSURE: 126 MMHG | DIASTOLIC BLOOD PRESSURE: 72 MMHG

## 2022-01-20 DIAGNOSIS — G62.9 NEUROPATHY: ICD-10-CM

## 2022-01-20 DIAGNOSIS — Z00.01 ENCOUNTER FOR GENERAL ADULT MEDICAL EXAMINATION WITH ABNORMAL FINDINGS: Primary | ICD-10-CM

## 2022-01-20 DIAGNOSIS — G62.9 NEUROPATHY: Primary | ICD-10-CM

## 2022-01-20 LAB
ALBUMIN SERPL-MCNC: 3.1 G/DL (ref 3.4–5)
ALBUMIN/GLOB SERPL: 1 {RATIO} (ref 1–2)
ALP LIVER SERPL-CCNC: 59 U/L
ALT SERPL-CCNC: 37 U/L
ANION GAP SERPL CALC-SCNC: 6 MMOL/L (ref 0–18)
AST SERPL-CCNC: 22 U/L (ref 15–37)
BILIRUB SERPL-MCNC: 0.6 MG/DL (ref 0.1–2)
BILIRUB UR QL: NEGATIVE
BUN BLD-MCNC: 18 MG/DL (ref 7–18)
BUN/CREAT SERPL: 13.8 (ref 10–20)
CALCIUM BLD-MCNC: 8.8 MG/DL (ref 8.5–10.1)
CHLORIDE SERPL-SCNC: 105 MMOL/L (ref 98–112)
CHOLEST SERPL-MCNC: 220 MG/DL (ref ?–200)
CO2 SERPL-SCNC: 30 MMOL/L (ref 21–32)
COLOR UR: YELLOW
CREAT BLD-MCNC: 1.3 MG/DL
EST. AVERAGE GLUCOSE BLD GHB EST-MCNC: 128 MG/DL (ref 68–126)
FASTING PATIENT LIPID ANSWER: YES
FASTING STATUS PATIENT QL REPORTED: YES
GLOBULIN PLAS-MCNC: 3 G/DL (ref 2.8–4.4)
GLUCOSE BLD-MCNC: 104 MG/DL (ref 70–99)
GLUCOSE UR-MCNC: NEGATIVE MG/DL
HBA1C MFR BLD: 6.1 % (ref ?–5.7)
HDLC SERPL-MCNC: 28 MG/DL (ref 40–59)
HGB UR QL STRIP.AUTO: NEGATIVE
HYALINE CASTS #/AREA URNS AUTO: PRESENT /LPF
KETONES UR-MCNC: NEGATIVE MG/DL
LDLC SERPL CALC-MCNC: 127 MG/DL (ref ?–100)
LEUKOCYTE ESTERASE UR QL STRIP.AUTO: NEGATIVE
NITRITE UR QL STRIP.AUTO: NEGATIVE
NONHDLC SERPL-MCNC: 192 MG/DL (ref ?–130)
OSMOLALITY SERPL CALC.SUM OF ELEC: 294 MOSM/KG (ref 275–295)
PH UR: 6 [PH] (ref 5–8)
POTASSIUM SERPL-SCNC: 3.9 MMOL/L (ref 3.5–5.1)
PROT SERPL-MCNC: 6.1 G/DL (ref 6.4–8.2)
PROT UR-MCNC: 30 MG/DL
SODIUM SERPL-SCNC: 141 MMOL/L (ref 136–145)
SP GR UR STRIP: 1.02 (ref 1–1.03)
TRIGL SERPL-MCNC: 365 MG/DL (ref 30–149)
TSI SER-ACNC: 1.42 MIU/ML (ref 0.36–3.74)
UROBILINOGEN UR STRIP-ACNC: <2
VIT B12 SERPL-MCNC: >2000 PG/ML (ref 193–986)
VIT D+METAB SERPL-MCNC: 37.1 NG/ML (ref 30–100)
VLDLC SERPL CALC-MCNC: 67 MG/DL (ref 0–30)

## 2022-01-20 PROCEDURE — 81001 URINALYSIS AUTO W/SCOPE: CPT

## 2022-01-20 PROCEDURE — 83036 HEMOGLOBIN GLYCOSYLATED A1C: CPT

## 2022-01-20 PROCEDURE — 99214 OFFICE O/P EST MOD 30 MIN: CPT | Performed by: OTHER

## 2022-01-20 PROCEDURE — 84443 ASSAY THYROID STIM HORMONE: CPT

## 2022-01-20 PROCEDURE — 82306 VITAMIN D 25 HYDROXY: CPT

## 2022-01-20 PROCEDURE — 3008F BODY MASS INDEX DOCD: CPT | Performed by: OTHER

## 2022-01-20 PROCEDURE — 82607 VITAMIN B-12: CPT

## 2022-01-20 PROCEDURE — 84207 ASSAY OF VITAMIN B-6: CPT

## 2022-01-20 PROCEDURE — 3074F SYST BP LT 130 MM HG: CPT | Performed by: OTHER

## 2022-01-20 PROCEDURE — 3078F DIAST BP <80 MM HG: CPT | Performed by: OTHER

## 2022-01-20 PROCEDURE — 80061 LIPID PANEL: CPT

## 2022-01-20 PROCEDURE — 36415 COLL VENOUS BLD VENIPUNCTURE: CPT

## 2022-01-20 PROCEDURE — 80053 COMPREHEN METABOLIC PANEL: CPT

## 2022-01-20 PROCEDURE — 84425 ASSAY OF VITAMIN B-1: CPT

## 2022-01-20 RX ORDER — CLOTRIMAZOLE AND BETAMETHASONE DIPROPIONATE 10; .64 MG/G; MG/G
CREAM TOPICAL
COMMUNITY
Start: 2021-09-15

## 2022-01-20 RX ORDER — DULOXETIN HYDROCHLORIDE 30 MG/1
CAPSULE, DELAYED RELEASE ORAL
Qty: 90 CAPSULE | Refills: 3 | Status: SHIPPED | OUTPATIENT
Start: 2022-01-20

## 2022-01-20 RX ORDER — KETOCONAZOLE 20 MG/G
CREAM TOPICAL
COMMUNITY
Start: 2021-12-16

## 2022-01-20 RX ORDER — KETOCONAZOLE 20 MG/ML
SHAMPOO TOPICAL
COMMUNITY
Start: 2021-12-16

## 2022-01-20 RX ORDER — ZOLPIDEM TARTRATE 5 MG/1
5 TABLET ORAL NIGHTLY
COMMUNITY
Start: 2021-11-12

## 2022-01-20 RX ORDER — ONDANSETRON HYDROCHLORIDE 8 MG/1
8 TABLET, FILM COATED ORAL EVERY 8 HOURS PRN
COMMUNITY
Start: 2021-09-30

## 2022-01-20 RX ORDER — DIAZEPAM 5 MG/1
TABLET ORAL
COMMUNITY
Start: 2021-07-27

## 2022-01-20 NOTE — PROGRESS NOTES
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 I had evaluated him in private practice over 10 years ago. He relates numbness is extended to mid calf. Main concern is balance difficulty.   He also has neuropathic pain in

## 2022-01-25 LAB — VITAMIN B1 (THIAMINE), WHOLE B: 125 NMOL/L

## 2022-01-26 LAB — VITAMIN B6: 61.8 NMOL/L

## 2022-02-08 RX ORDER — CELECOXIB 200 MG/1
200 CAPSULE ORAL DAILY
Qty: 90 CAPSULE | Refills: 0 | Status: SHIPPED | OUTPATIENT
Start: 2022-02-08 | End: 2022-05-09

## 2022-02-08 RX ORDER — DUTASTERIDE 0.5 MG/1
0.5 CAPSULE, LIQUID FILLED ORAL DAILY
Qty: 90 CAPSULE | Refills: 0 | Status: SHIPPED | OUTPATIENT
Start: 2022-02-08 | End: 2022-05-09

## 2022-02-08 RX ORDER — DUTASTERIDE 0.5 MG/1
0.5 CAPSULE, LIQUID FILLED ORAL DAILY
COMMUNITY
End: 2022-02-08

## 2022-02-08 RX ORDER — GABAPENTIN 300 MG/1
300 CAPSULE ORAL NIGHTLY
Qty: 90 CAPSULE | Refills: 0 | Status: SHIPPED | OUTPATIENT
Start: 2022-02-08 | End: 2022-05-09

## 2022-02-17 ENCOUNTER — TELEPHONE (OUTPATIENT)
Dept: PAIN CLINIC | Facility: HOSPITAL | Age: 62
End: 2022-02-17

## 2022-02-17 NOTE — TELEPHONE ENCOUNTER
Patient came into clinic to touch base with  regarding medication refill, per Dr. Naima Martinez recommendation.  Please call patient at [phone] when refill complete

## 2022-02-18 RX ORDER — HYDROCODONE BITARTRATE AND ACETAMINOPHEN 10; 325 MG/1; MG/1
1 TABLET ORAL EVERY 6 HOURS PRN
Qty: 120 TABLET | Refills: 0 | Status: SHIPPED | OUTPATIENT
Start: 2022-02-18 | End: 2022-03-20

## 2022-03-18 ENCOUNTER — TELEPHONE (OUTPATIENT)
Dept: PAIN CLINIC | Facility: HOSPITAL | Age: 62
End: 2022-03-18

## 2022-03-18 RX ORDER — CELECOXIB 200 MG/1
200 CAPSULE ORAL DAILY
Qty: 30 CAPSULE | Refills: 1 | Status: SHIPPED | OUTPATIENT
Start: 2022-03-18 | End: 2022-05-17

## 2022-03-18 RX ORDER — HYDROCODONE BITARTRATE AND ACETAMINOPHEN 10; 325 MG/1; MG/1
1 TABLET ORAL EVERY 6 HOURS PRN
Qty: 120 TABLET | Refills: 0 | Status: SHIPPED | OUTPATIENT
Start: 2022-03-18 | End: 2022-04-17

## 2022-03-30 ENCOUNTER — APPOINTMENT (OUTPATIENT)
Dept: MRI IMAGING | Facility: HOSPITAL | Age: 62
End: 2022-03-30
Attending: EMERGENCY MEDICINE
Payer: COMMERCIAL

## 2022-03-30 ENCOUNTER — HOSPITAL ENCOUNTER (EMERGENCY)
Facility: HOSPITAL | Age: 62
Discharge: HOME | End: 2022-03-30
Attending: EMERGENCY MEDICINE
Payer: COMMERCIAL

## 2022-03-30 VITALS
OXYGEN SATURATION: 97 % | HEIGHT: 73 IN | WEIGHT: 220 LBS | SYSTOLIC BLOOD PRESSURE: 138 MMHG | DIASTOLIC BLOOD PRESSURE: 99 MMHG | BODY MASS INDEX: 29.16 KG/M2 | HEART RATE: 64 BPM | RESPIRATION RATE: 18 BRPM | TEMPERATURE: 98 F

## 2022-03-30 DIAGNOSIS — H53.2 DIPLOPIA: Primary | ICD-10-CM

## 2022-03-30 LAB
ANION GAP SERPL CALC-SCNC: 6 MMOL/L (ref 0–18)
BASOPHILS # BLD AUTO: 0.05 X10(3) UL (ref 0–0.2)
BASOPHILS NFR BLD AUTO: 0.5 %
BUN BLD-MCNC: 13 MG/DL (ref 7–18)
BUN/CREAT SERPL: 11.1 (ref 10–20)
CALCIUM BLD-MCNC: 8.8 MG/DL (ref 8.5–10.1)
CHLORIDE SERPL-SCNC: 110 MMOL/L (ref 98–112)
CO2 SERPL-SCNC: 27 MMOL/L (ref 21–32)
CREAT BLD-MCNC: 1.17 MG/DL
DEPRECATED RDW RBC AUTO: 46.5 FL (ref 35.1–46.3)
EOSINOPHIL # BLD AUTO: 0.13 X10(3) UL (ref 0–0.7)
EOSINOPHIL NFR BLD AUTO: 1.3 %
ERYTHROCYTE [DISTWIDTH] IN BLOOD BY AUTOMATED COUNT: 13.5 % (ref 11–15)
GLUCOSE BLD-MCNC: 103 MG/DL (ref 70–99)
HCT VFR BLD AUTO: 44.5 %
HGB BLD-MCNC: 14.8 G/DL
IMM GRANULOCYTES # BLD AUTO: 0.05 X10(3) UL (ref 0–1)
IMM GRANULOCYTES NFR BLD: 0.5 %
LYMPHOCYTES # BLD AUTO: 1.77 X10(3) UL (ref 1–4)
LYMPHOCYTES NFR BLD AUTO: 18.2 %
MCH RBC QN AUTO: 31.6 PG (ref 26–34)
MCHC RBC AUTO-ENTMCNC: 33.3 G/DL (ref 31–37)
MCV RBC AUTO: 94.9 FL
MONOCYTES # BLD AUTO: 0.6 X10(3) UL (ref 0.1–1)
MONOCYTES NFR BLD AUTO: 6.2 %
NEUTROPHILS # BLD AUTO: 7.14 X10 (3) UL (ref 1.5–7.7)
NEUTROPHILS # BLD AUTO: 7.14 X10(3) UL (ref 1.5–7.7)
NEUTROPHILS NFR BLD AUTO: 73.3 %
OSMOLALITY SERPL CALC.SUM OF ELEC: 296 MOSM/KG (ref 275–295)
PLATELET # BLD AUTO: 176 10(3)UL (ref 150–450)
POTASSIUM SERPL-SCNC: 3.6 MMOL/L (ref 3.5–5.1)
RBC # BLD AUTO: 4.69 X10(6)UL
SODIUM SERPL-SCNC: 143 MMOL/L (ref 136–145)
WBC # BLD AUTO: 9.7 X10(3) UL (ref 4–11)

## 2022-03-30 PROCEDURE — 85025 COMPLETE CBC W/AUTO DIFF WBC: CPT | Performed by: EMERGENCY MEDICINE

## 2022-03-30 PROCEDURE — 70553 MRI BRAIN STEM W/O & W/DYE: CPT | Performed by: EMERGENCY MEDICINE

## 2022-03-30 PROCEDURE — 80048 BASIC METABOLIC PNL TOTAL CA: CPT | Performed by: EMERGENCY MEDICINE

## 2022-03-30 PROCEDURE — 99284 EMERGENCY DEPT VISIT MOD MDM: CPT

## 2022-03-30 PROCEDURE — A9575 INJ GADOTERATE MEGLUMI 0.1ML: HCPCS | Performed by: EMERGENCY MEDICINE

## 2022-03-30 PROCEDURE — 96374 THER/PROPH/DIAG INJ IV PUSH: CPT

## 2022-03-30 RX ORDER — LORAZEPAM 2 MG/ML
1 INJECTION INTRAMUSCULAR ONCE
Status: COMPLETED | OUTPATIENT
Start: 2022-03-30 | End: 2022-03-30

## 2022-03-31 NOTE — ED QUICK NOTES
Pt A&Ox4. Pt states intermittent changes in vision x4 days. Pt cleared by optometrist and sent to ED for further evaluation. Pt denies changes to vision at this time. No neuro deficits noted. Pt denies n/v, headache, or dizziness. Pieter Lipoma

## 2022-03-31 NOTE — ED INITIAL ASSESSMENT (HPI)
Pt c/o double vision x4 days, was seen by optometrist and cleared, was told by Dr. Yuki Alberto to be seen in ED for MRI.

## 2022-04-29 ENCOUNTER — LAB ENCOUNTER (OUTPATIENT)
Dept: LAB | Facility: HOSPITAL | Age: 62
End: 2022-04-29
Attending: INTERNAL MEDICINE
Payer: COMMERCIAL

## 2022-04-29 DIAGNOSIS — N18.31 STAGE 3A CHRONIC KIDNEY DISEASE (HCC): ICD-10-CM

## 2022-04-29 DIAGNOSIS — I10 ESSENTIAL HYPERTENSION: ICD-10-CM

## 2022-04-29 LAB
ANION GAP SERPL CALC-SCNC: 6 MMOL/L (ref 0–18)
BUN BLD-MCNC: 13 MG/DL (ref 7–18)
BUN/CREAT SERPL: 9.7 (ref 10–20)
CALCIUM BLD-MCNC: 9.5 MG/DL (ref 8.5–10.1)
CHLORIDE SERPL-SCNC: 105 MMOL/L (ref 98–112)
CO2 SERPL-SCNC: 33 MMOL/L (ref 21–32)
CREAT BLD-MCNC: 1.34 MG/DL
FASTING STATUS PATIENT QL REPORTED: YES
GLUCOSE BLD-MCNC: 107 MG/DL (ref 70–99)
OSMOLALITY SERPL CALC.SUM OF ELEC: 299 MOSM/KG (ref 275–295)
POTASSIUM SERPL-SCNC: 3.5 MMOL/L (ref 3.5–5.1)
SODIUM SERPL-SCNC: 144 MMOL/L (ref 136–145)

## 2022-04-29 PROCEDURE — 80048 BASIC METABOLIC PNL TOTAL CA: CPT

## 2022-04-29 PROCEDURE — 93005 ELECTROCARDIOGRAM TRACING: CPT

## 2022-04-29 PROCEDURE — 36415 COLL VENOUS BLD VENIPUNCTURE: CPT

## 2022-04-29 PROCEDURE — 93010 ELECTROCARDIOGRAM REPORT: CPT | Performed by: RADIOLOGY

## 2022-05-10 RX ORDER — DUTASTERIDE 0.5 MG/1
0.5 CAPSULE, LIQUID FILLED ORAL DAILY
Qty: 90 CAPSULE | Refills: 0 | Status: SHIPPED | OUTPATIENT
Start: 2022-05-10 | End: 2022-08-08

## 2022-05-26 ENCOUNTER — LAB ENCOUNTER (OUTPATIENT)
Dept: LAB | Facility: HOSPITAL | Age: 62
End: 2022-05-26
Attending: UROLOGY
Payer: COMMERCIAL

## 2022-05-26 DIAGNOSIS — C61 PROSTATE CANCER (HCC): Primary | ICD-10-CM

## 2022-05-26 DIAGNOSIS — K52.9 SEVERE DIARRHEA: ICD-10-CM

## 2022-05-26 LAB
CREAT BLD-MCNC: 1.27 MG/DL
PSA SERPL-MCNC: 8.13 NG/ML (ref ?–4)

## 2022-05-26 PROCEDURE — 87493 C DIFF AMPLIFIED PROBE: CPT

## 2022-05-26 PROCEDURE — 82565 ASSAY OF CREATININE: CPT

## 2022-05-26 PROCEDURE — 84153 ASSAY OF PSA TOTAL: CPT

## 2022-05-26 PROCEDURE — 36415 COLL VENOUS BLD VENIPUNCTURE: CPT

## 2022-05-27 LAB — C DIFF TOX B STL QL: NEGATIVE

## 2022-05-31 ENCOUNTER — LAB ENCOUNTER (OUTPATIENT)
Dept: LAB | Age: 62
End: 2022-05-31
Attending: UROLOGY
Payer: COMMERCIAL

## 2022-05-31 ENCOUNTER — HOSPITAL ENCOUNTER (OUTPATIENT)
Dept: ULTRASOUND IMAGING | Age: 62
Discharge: HOME | End: 2022-05-31
Attending: UROLOGY
Payer: COMMERCIAL

## 2022-05-31 DIAGNOSIS — C61 PROSTATE CANCER (HCC): ICD-10-CM

## 2022-05-31 DIAGNOSIS — R35.0 FREQUENCY OF URINATION: ICD-10-CM

## 2022-05-31 PROCEDURE — 87086 URINE CULTURE/COLONY COUNT: CPT

## 2022-05-31 PROCEDURE — 76857 US EXAM PELVIC LIMITED: CPT | Performed by: UROLOGY

## 2022-06-14 ENCOUNTER — HOSPITAL ENCOUNTER (EMERGENCY)
Facility: HOSPITAL | Age: 62
Discharge: HOME | End: 2022-06-14
Attending: EMERGENCY MEDICINE
Payer: COMMERCIAL

## 2022-06-14 VITALS
TEMPERATURE: 97 F | RESPIRATION RATE: 14 BRPM | HEART RATE: 58 BPM | DIASTOLIC BLOOD PRESSURE: 80 MMHG | SYSTOLIC BLOOD PRESSURE: 122 MMHG | OXYGEN SATURATION: 93 %

## 2022-06-14 DIAGNOSIS — T78.2XXA ANAPHYLAXIS, INITIAL ENCOUNTER: Primary | ICD-10-CM

## 2022-06-14 DIAGNOSIS — N17.9 AKI (ACUTE KIDNEY INJURY) (HCC): ICD-10-CM

## 2022-06-14 LAB
ANION GAP SERPL CALC-SCNC: 11 MMOL/L (ref 0–18)
BASOPHILS # BLD AUTO: 0.02 X10(3) UL (ref 0–0.2)
BASOPHILS NFR BLD AUTO: 0.2 %
BUN BLD-MCNC: 23 MG/DL (ref 7–18)
BUN/CREAT SERPL: 14.7 (ref 10–20)
CALCIUM BLD-MCNC: 8.3 MG/DL (ref 8.5–10.1)
CHLORIDE SERPL-SCNC: 103 MMOL/L (ref 98–112)
CO2 SERPL-SCNC: 20 MMOL/L (ref 21–32)
CREAT BLD-MCNC: 1.56 MG/DL
DEPRECATED RDW RBC AUTO: 46.3 FL (ref 35.1–46.3)
EOSINOPHIL # BLD AUTO: 0.02 X10(3) UL (ref 0–0.7)
EOSINOPHIL NFR BLD AUTO: 0.2 %
ERYTHROCYTE [DISTWIDTH] IN BLOOD BY AUTOMATED COUNT: 13.7 % (ref 11–15)
GLUCOSE BLD-MCNC: 297 MG/DL (ref 70–99)
HCT VFR BLD AUTO: 50.8 %
HGB BLD-MCNC: 16.6 G/DL
IMM GRANULOCYTES # BLD AUTO: 0.12 X10(3) UL (ref 0–1)
IMM GRANULOCYTES NFR BLD: 1 %
LYMPHOCYTES # BLD AUTO: 1.07 X10(3) UL (ref 1–4)
LYMPHOCYTES NFR BLD AUTO: 8.7 %
MCH RBC QN AUTO: 30 PG (ref 26–34)
MCHC RBC AUTO-ENTMCNC: 32.7 G/DL (ref 31–37)
MCV RBC AUTO: 91.9 FL
MONOCYTES # BLD AUTO: 0.47 X10(3) UL (ref 0.1–1)
MONOCYTES NFR BLD AUTO: 3.8 %
NEUTROPHILS # BLD AUTO: 10.62 X10 (3) UL (ref 1.5–7.7)
NEUTROPHILS # BLD AUTO: 10.62 X10(3) UL (ref 1.5–7.7)
NEUTROPHILS NFR BLD AUTO: 86.1 %
OSMOLALITY SERPL CALC.SUM OF ELEC: 293 MOSM/KG (ref 275–295)
PLATELET # BLD AUTO: 243 10(3)UL (ref 150–450)
POTASSIUM SERPL-SCNC: 3.8 MMOL/L (ref 3.5–5.1)
RBC # BLD AUTO: 5.53 X10(6)UL
SARS-COV-2 RNA RESP QL NAA+PROBE: NOT DETECTED
SODIUM SERPL-SCNC: 134 MMOL/L (ref 136–145)
TROPONIN I HIGH SENSITIVITY: 6 NG/L
WBC # BLD AUTO: 12.3 X10(3) UL (ref 4–11)

## 2022-06-14 PROCEDURE — 96361 HYDRATE IV INFUSION ADD-ON: CPT

## 2022-06-14 PROCEDURE — 84484 ASSAY OF TROPONIN QUANT: CPT | Performed by: EMERGENCY MEDICINE

## 2022-06-14 PROCEDURE — 96375 TX/PRO/DX INJ NEW DRUG ADDON: CPT

## 2022-06-14 PROCEDURE — 80048 BASIC METABOLIC PNL TOTAL CA: CPT | Performed by: EMERGENCY MEDICINE

## 2022-06-14 PROCEDURE — 96372 THER/PROPH/DIAG INJ SC/IM: CPT

## 2022-06-14 PROCEDURE — 93010 ELECTROCARDIOGRAM REPORT: CPT | Performed by: EMERGENCY MEDICINE

## 2022-06-14 PROCEDURE — 93005 ELECTROCARDIOGRAM TRACING: CPT

## 2022-06-14 PROCEDURE — S0028 INJECTION, FAMOTIDINE, 20 MG: HCPCS | Performed by: EMERGENCY MEDICINE

## 2022-06-14 PROCEDURE — 99285 EMERGENCY DEPT VISIT HI MDM: CPT

## 2022-06-14 PROCEDURE — 96374 THER/PROPH/DIAG INJ IV PUSH: CPT

## 2022-06-14 PROCEDURE — 85025 COMPLETE CBC W/AUTO DIFF WBC: CPT | Performed by: EMERGENCY MEDICINE

## 2022-06-14 PROCEDURE — 94640 AIRWAY INHALATION TREATMENT: CPT

## 2022-06-14 RX ORDER — FAMOTIDINE 20 MG/1
20 TABLET, FILM COATED ORAL 2 TIMES DAILY
Qty: 14 TABLET | Refills: 0 | Status: SHIPPED | OUTPATIENT
Start: 2022-06-14 | End: 2022-06-21

## 2022-06-14 RX ORDER — PREDNISONE 20 MG/1
60 TABLET ORAL DAILY
Qty: 12 TABLET | Refills: 0 | Status: SHIPPED | OUTPATIENT
Start: 2022-06-14 | End: 2022-06-18

## 2022-06-14 RX ORDER — IPRATROPIUM BROMIDE AND ALBUTEROL SULFATE 2.5; .5 MG/3ML; MG/3ML
3 SOLUTION RESPIRATORY (INHALATION) ONCE
Status: COMPLETED | OUTPATIENT
Start: 2022-06-14 | End: 2022-06-14

## 2022-06-14 RX ORDER — METHYLPREDNISOLONE SODIUM SUCCINATE 125 MG/2ML
125 INJECTION, POWDER, LYOPHILIZED, FOR SOLUTION INTRAMUSCULAR; INTRAVENOUS ONCE
Status: COMPLETED | OUTPATIENT
Start: 2022-06-14 | End: 2022-06-14

## 2022-06-14 RX ORDER — ALBUTEROL SULFATE 90 UG/1
2 AEROSOL, METERED RESPIRATORY (INHALATION) EVERY 4 HOURS PRN
Qty: 1 EACH | Refills: 0 | Status: SHIPPED | OUTPATIENT
Start: 2022-06-14 | End: 2022-07-14

## 2022-06-14 RX ORDER — HYDROXYZINE HYDROCHLORIDE 50 MG/ML
50 INJECTION, SOLUTION INTRAMUSCULAR ONCE
Status: COMPLETED | OUTPATIENT
Start: 2022-06-14 | End: 2022-06-14

## 2022-06-14 RX ORDER — FAMOTIDINE 10 MG/ML
20 INJECTION, SOLUTION INTRAVENOUS ONCE
Status: COMPLETED | OUTPATIENT
Start: 2022-06-14 | End: 2022-06-14

## 2022-06-14 RX ORDER — EPINEPHRINE 0.3 MG/.3ML
0.3 INJECTION SUBCUTANEOUS
Qty: 1 EACH | Refills: 0 | Status: SHIPPED | OUTPATIENT
Start: 2022-06-14 | End: 2022-07-14

## 2022-06-14 NOTE — ED INITIAL ASSESSMENT (HPI)
Epinephrine mg .3 given in route. Patient had a banana this am, drove to work started having swelling, angioedema, patient took claritin x 2. When patient got to work, had a syncopal episode, but was caught. Ambulance was called and patient was given 5l per NC and epinephrine. Patient alert and oriented and feels better.

## 2022-06-16 ENCOUNTER — HOSPITAL ENCOUNTER (OUTPATIENT)
Dept: ULTRASOUND IMAGING | Age: 62
Discharge: HOME | End: 2022-06-16
Attending: UROLOGY
Payer: COMMERCIAL

## 2022-06-16 DIAGNOSIS — C61 PROSTATE CANCER (HCC): ICD-10-CM

## 2022-06-16 DIAGNOSIS — R35.0 FREQUENCY OF URINATION: ICD-10-CM

## 2022-06-16 DIAGNOSIS — N18.9 CHRONIC KIDNEY DISEASE, UNSPECIFIED CKD STAGE: ICD-10-CM

## 2022-06-16 DIAGNOSIS — R10.9 STOMACH ACHE: ICD-10-CM

## 2022-06-16 DIAGNOSIS — R35.1 NOCTURIA: ICD-10-CM

## 2022-06-16 PROCEDURE — 76770 US EXAM ABDO BACK WALL COMP: CPT | Performed by: UROLOGY

## 2022-07-18 DIAGNOSIS — G62.9 NEUROPATHY: Primary | ICD-10-CM

## 2022-07-18 RX ORDER — DULOXETIN HYDROCHLORIDE 30 MG/1
CAPSULE, DELAYED RELEASE ORAL
Qty: 180 CAPSULE | Refills: 0 | Status: SHIPPED | OUTPATIENT
Start: 2022-07-18

## 2022-07-18 RX ORDER — DUTASTERIDE 0.5 MG/1
CAPSULE, LIQUID FILLED ORAL
Qty: 90 CAPSULE | Refills: 0 | Status: SHIPPED | OUTPATIENT
Start: 2022-07-18

## 2022-07-18 NOTE — TELEPHONE ENCOUNTER
DULoxetine (CYMBALTA) 30 MG Oral Cap DR Particles  Take 2 tablets by mouth at night  180, no refills      LOV: 1/20/22  NOV: None scheduled  Last refilled: 1/20/2022

## 2022-07-19 RX ORDER — DUTASTERIDE 0.5 MG/1
CAPSULE, LIQUID FILLED ORAL
Qty: 90 CAPSULE | Refills: 0 | OUTPATIENT
Start: 2022-07-19

## 2022-09-21 RX ORDER — CELECOXIB 200 MG/1
200 CAPSULE ORAL 2 TIMES DAILY
Qty: 60 CAPSULE | Refills: 0 | Status: SHIPPED | OUTPATIENT
Start: 2022-09-21 | End: 2022-10-21

## 2022-09-21 RX ORDER — HYDROCODONE BITARTRATE AND ACETAMINOPHEN 10; 325 MG/1; MG/1
1 TABLET ORAL EVERY 6 HOURS PRN
Qty: 120 TABLET | Refills: 0 | Status: SHIPPED | OUTPATIENT
Start: 2022-09-21 | End: 2022-10-21

## 2022-10-19 RX ORDER — CELECOXIB 200 MG/1
CAPSULE ORAL
Qty: 60 CAPSULE | Refills: 0 | Status: SHIPPED | OUTPATIENT
Start: 2022-10-19

## 2022-10-20 ENCOUNTER — HOSPITAL ENCOUNTER (OUTPATIENT)
Dept: GENERAL RADIOLOGY | Age: 62
Discharge: HOME | End: 2022-10-20
Attending: UROLOGY
Payer: COMMERCIAL

## 2022-10-20 ENCOUNTER — LAB ENCOUNTER (OUTPATIENT)
Dept: LAB | Age: 62
End: 2022-10-20
Attending: UROLOGY
Payer: COMMERCIAL

## 2022-10-20 ENCOUNTER — HOSPITAL ENCOUNTER (OUTPATIENT)
Dept: ULTRASOUND IMAGING | Age: 62
Discharge: HOME | End: 2022-10-20
Attending: UROLOGY
Payer: COMMERCIAL

## 2022-10-20 DIAGNOSIS — C61 PROSTATE CANCER (HCC): ICD-10-CM

## 2022-10-20 DIAGNOSIS — N20.0 KIDNEY STONE: ICD-10-CM

## 2022-10-20 DIAGNOSIS — G62.9 NEUROPATHY: ICD-10-CM

## 2022-10-20 DIAGNOSIS — C61 PROSTATE CANCER (HCC): Primary | ICD-10-CM

## 2022-10-20 LAB
ANION GAP SERPL CALC-SCNC: 8 MMOL/L (ref 0–18)
BUN BLD-MCNC: 15 MG/DL (ref 7–18)
BUN/CREAT SERPL: 12.7 (ref 10–20)
CALCIUM BLD-MCNC: 8.8 MG/DL (ref 8.5–10.1)
CHLORIDE SERPL-SCNC: 109 MMOL/L (ref 98–112)
CO2 SERPL-SCNC: 24 MMOL/L (ref 21–32)
CREAT BLD-MCNC: 1.18 MG/DL
FASTING STATUS PATIENT QL REPORTED: YES
GFR SERPLBLD BASED ON 1.73 SQ M-ARVRAT: 70 ML/MIN/1.73M2 (ref 60–?)
GLUCOSE BLD-MCNC: 113 MG/DL (ref 70–99)
OSMOLALITY SERPL CALC.SUM OF ELEC: 294 MOSM/KG (ref 275–295)
POTASSIUM SERPL-SCNC: 3.9 MMOL/L (ref 3.5–5.1)
PSA SERPL-MCNC: 1.38 NG/ML (ref ?–4)
SODIUM SERPL-SCNC: 141 MMOL/L (ref 136–145)

## 2022-10-20 PROCEDURE — 76770 US EXAM ABDO BACK WALL COMP: CPT | Performed by: UROLOGY

## 2022-10-20 PROCEDURE — 84153 ASSAY OF PSA TOTAL: CPT

## 2022-10-20 PROCEDURE — 36415 COLL VENOUS BLD VENIPUNCTURE: CPT

## 2022-10-20 PROCEDURE — 80048 BASIC METABOLIC PNL TOTAL CA: CPT

## 2022-10-20 PROCEDURE — 84446 ASSAY OF VITAMIN E: CPT

## 2022-10-20 PROCEDURE — 74018 RADEX ABDOMEN 1 VIEW: CPT | Performed by: UROLOGY

## 2022-10-24 LAB
ALPHA-TOCOPHEROL (VIT E) -MG/L: 12.5 MG/L
GAMMA-TOCOPHEROL (VIT E) -MG/L: 1.7 MG/L

## 2022-10-26 ENCOUNTER — APPOINTMENT (OUTPATIENT)
Dept: URBAN - METROPOLITAN AREA CLINIC 244 | Age: 62
Setting detail: DERMATOLOGY
End: 2022-10-27

## 2022-10-26 DIAGNOSIS — L81.4 OTHER MELANIN HYPERPIGMENTATION: ICD-10-CM

## 2022-10-26 DIAGNOSIS — L82.1 OTHER SEBORRHEIC KERATOSIS: ICD-10-CM

## 2022-10-26 DIAGNOSIS — D22 MELANOCYTIC NEVI: ICD-10-CM

## 2022-10-26 DIAGNOSIS — L82.0 INFLAMED SEBORRHEIC KERATOSIS: ICD-10-CM

## 2022-10-26 PROBLEM — D48.5 NEOPLASM OF UNCERTAIN BEHAVIOR OF SKIN: Status: ACTIVE | Noted: 2022-10-26

## 2022-10-26 PROBLEM — D22.71 MELANOCYTIC NEVI OF RIGHT LOWER LIMB, INCLUDING HIP: Status: ACTIVE | Noted: 2022-10-26

## 2022-10-26 PROBLEM — D22.5 MELANOCYTIC NEVI OF TRUNK: Status: ACTIVE | Noted: 2022-10-26

## 2022-10-26 PROCEDURE — 11103 TANGNTL BX SKIN EA SEP/ADDL: CPT | Mod: 59

## 2022-10-26 PROCEDURE — OTHER BIOPSY BY SHAVE METHOD: OTHER

## 2022-10-26 PROCEDURE — 11102 TANGNTL BX SKIN SINGLE LES: CPT | Mod: 59

## 2022-10-26 PROCEDURE — OTHER COUNSELING: OTHER

## 2022-10-26 PROCEDURE — 99213 OFFICE O/P EST LOW 20 MIN: CPT | Mod: 25

## 2022-10-26 PROCEDURE — 17110 DESTRUCT B9 LESION 1-14: CPT

## 2022-10-26 PROCEDURE — OTHER LIQUID NITROGEN: OTHER

## 2022-10-26 ASSESSMENT — LOCATION SIMPLE DESCRIPTION DERM
LOCATION SIMPLE: PLANTAR SURFACE OF RIGHT 2ND TOE
LOCATION SIMPLE: CHEST
LOCATION SIMPLE: RIGHT UPPER BACK
LOCATION SIMPLE: SCALP
LOCATION SIMPLE: LEFT UPPER BACK

## 2022-10-26 ASSESSMENT — LOCATION DETAILED DESCRIPTION DERM
LOCATION DETAILED: STERNUM
LOCATION DETAILED: RIGHT SUPERIOR MEDIAL UPPER BACK
LOCATION DETAILED: TIP OF RIGHT 2ND TOE
LOCATION DETAILED: UPPER STERNUM
LOCATION DETAILED: RIGHT SUPERIOR PARIETAL SCALP
LOCATION DETAILED: LEFT MEDIAL UPPER BACK

## 2022-10-26 ASSESSMENT — LOCATION ZONE DERM
LOCATION ZONE: SCALP
LOCATION ZONE: TRUNK
LOCATION ZONE: TOE

## 2022-10-26 NOTE — PROCEDURE: BIOPSY BY SHAVE METHOD
Cryotherapy Text: The wound bed was treated with cryotherapy after the biopsy was performed.
Hide Accession Number?: No
Consent: Written consent was obtained and risks were reviewed including but not limited to scarring, infection, bleeding, scabbing, incomplete removal, nerve damage and allergy to anesthesia.
Notification Instructions: Patient will be notified of biopsy results. However, patient instructed to call the office if not contacted within 2 weeks.
Silver Nitrate Text: The wound bed was treated with silver nitrate after the biopsy was performed.
Was A Bandage Applied: Yes
Billing Type: Third-Party Bill
Wound Care: Petrolatum
Electrodesiccation Text: The wound bed was treated with electrodesiccation after the biopsy was performed.
Biopsy Method: Dermablade
Information: Selecting Yes will display possible errors in your note based on the variables you have selected. This validation is only offered as a suggestion for you. PLEASE NOTE THAT THE VALIDATION TEXT WILL BE REMOVED WHEN YOU FINALIZE YOUR NOTE. IF YOU WANT TO FAX A PRELIMINARY NOTE YOU WILL NEED TO TOGGLE THIS TO 'NO' IF YOU DO NOT WANT IT IN YOUR FAXED NOTE.
X Size Of Lesion In Cm: 0
Curettage Text: The wound bed was treated with curettage after the biopsy was performed.
Depth Of Biopsy: dermis
Anesthesia Volume In Cc (Will Not Render If 0): 0.5
Dressing: bandage
Anesthesia Volume In Cc (Will Not Render If 0): 1
Biopsy Method: double edge Personna blade
Post-Care Instructions: I reviewed with the patient in detail post-care instructions. Patient is to keep the biopsy site dry overnight, and then apply vaseline once or twice daily until healed.
Detail Level: Detailed
Hemostasis: Drysol
Type Of Destruction Used: Curettage
Anesthesia Type: 0.5% lidocaine with 1:200,000 epinephrine and a 1:10 solution of 8.4% sodium bicarbonate
Electrodesiccation And Curettage Text: The wound bed was treated with electrodesiccation and curettage after the biopsy was performed.
Anesthesia Type: 1% lidocaine with epinephrine
Path Notes (To The Dermatopathologist): rule out basal cell carcinoma vs other NMSC vs other
Biopsy Type: H and E
Post-Care Instructions: I reviewed with the patient in detail post-care instructions. Patient is to keep the biopsy site dry overnight, and then apply Petrolatum twice daily until healed, or after a night or two leave area open and dry overnight and a scab will form which will fall off on its own in a few weeks.

## 2022-10-26 NOTE — PROCEDURE: LIQUID NITROGEN
Consent: The patient's consent was obtained including but not limited to risks of crusting, scabbing, blistering, scarring, darker or lighter pigmentary change, recurrence, incomplete removal and infection.
Show Spray Paint Technique Variable?: Yes
Medical Necessity Clause: This procedure was medically necessary because the lesions that were treated were:
Post-Care Instructions: I reviewed with the patient in detail post-care instructions. Patient is to wear sunprotection, and avoid picking at any of the treated lesions. Pt may apply Vaseline to crusted or scabbing areas.
Spray Paint Technique: No
Detail Level: Zone
Spray Paint Text: The liquid nitrogen was applied to the skin utilizing a spray paint frosting technique.
Total Number Of Lesions Treated: 3
Number Of Freeze-Thaw Cycles: 1 freeze-thaw cycle
Medical Necessity Information: It is in your best interest to select a reason for this procedure from the list below. All of these items fulfill various CMS LCD requirements except the new and changing color options.

## 2022-10-27 ENCOUNTER — ORDERS FOR ADMISSION (OUTPATIENT)
Dept: UROLOGY | Facility: HOSPITAL | Age: 62
End: 2022-10-27

## 2022-10-28 ENCOUNTER — HOSPITAL ENCOUNTER (OUTPATIENT)
Dept: CT IMAGING | Age: 62
Discharge: HOME | End: 2022-10-28
Attending: UROLOGY
Payer: COMMERCIAL

## 2022-10-28 ENCOUNTER — LAB ENCOUNTER (OUTPATIENT)
Dept: LAB | Age: 62
End: 2022-10-28
Attending: UROLOGY
Payer: COMMERCIAL

## 2022-10-28 DIAGNOSIS — C61 PROSTATE CANCER (HCC): ICD-10-CM

## 2022-10-28 DIAGNOSIS — Z01.818 PRE-OP TESTING: ICD-10-CM

## 2022-10-28 PROCEDURE — 76377 3D RENDER W/INTRP POSTPROCES: CPT | Performed by: UROLOGY

## 2022-10-28 PROCEDURE — 74178 CT ABD&PLV WO CNTR FLWD CNTR: CPT | Performed by: UROLOGY

## 2022-10-29 LAB — SARS-COV-2 RNA RESP QL NAA+PROBE: NOT DETECTED

## 2022-10-31 ENCOUNTER — ANESTHESIA (OUTPATIENT)
Dept: SURGERY | Facility: HOSPITAL | Age: 62
End: 2022-10-31
Payer: COMMERCIAL

## 2022-10-31 ENCOUNTER — HOSPITAL ENCOUNTER (OUTPATIENT)
Facility: HOSPITAL | Age: 62
Setting detail: HOSPITAL OUTPATIENT SURGERY
Discharge: HOME | End: 2022-10-31
Attending: UROLOGY | Admitting: UROLOGY
Payer: COMMERCIAL

## 2022-10-31 ENCOUNTER — APPOINTMENT (OUTPATIENT)
Dept: GENERAL RADIOLOGY | Facility: HOSPITAL | Age: 62
End: 2022-10-31
Attending: UROLOGY
Payer: COMMERCIAL

## 2022-10-31 ENCOUNTER — ANESTHESIA EVENT (OUTPATIENT)
Dept: SURGERY | Facility: HOSPITAL | Age: 62
End: 2022-10-31
Payer: COMMERCIAL

## 2022-10-31 VITALS
BODY MASS INDEX: 29.52 KG/M2 | DIASTOLIC BLOOD PRESSURE: 74 MMHG | SYSTOLIC BLOOD PRESSURE: 122 MMHG | OXYGEN SATURATION: 94 % | HEIGHT: 74 IN | WEIGHT: 230 LBS | TEMPERATURE: 98 F | HEART RATE: 64 BPM | RESPIRATION RATE: 18 BRPM

## 2022-10-31 DIAGNOSIS — Z01.818 PRE-OP TESTING: Primary | ICD-10-CM

## 2022-10-31 PROCEDURE — 0TBB8ZX EXCISION OF BLADDER, VIA NATURAL OR ARTIFICIAL OPENING ENDOSCOPIC, DIAGNOSTIC: ICD-10-PCS | Performed by: UROLOGY

## 2022-10-31 PROCEDURE — 0T5B8ZZ DESTRUCTION OF BLADDER, VIA NATURAL OR ARTIFICIAL OPENING ENDOSCOPIC: ICD-10-PCS | Performed by: UROLOGY

## 2022-10-31 PROCEDURE — 74420 UROGRAPHY RTRGR +-KUB: CPT | Performed by: UROLOGY

## 2022-10-31 PROCEDURE — 88305 TISSUE EXAM BY PATHOLOGIST: CPT | Performed by: UROLOGY

## 2022-10-31 PROCEDURE — 88313 SPECIAL STAINS GROUP 2: CPT | Performed by: UROLOGY

## 2022-10-31 RX ORDER — SODIUM CHLORIDE, SODIUM LACTATE, POTASSIUM CHLORIDE, CALCIUM CHLORIDE 600; 310; 30; 20 MG/100ML; MG/100ML; MG/100ML; MG/100ML
INJECTION, SOLUTION INTRAVENOUS CONTINUOUS
Status: DISCONTINUED | OUTPATIENT
Start: 2022-10-31 | End: 2022-10-31

## 2022-10-31 RX ORDER — DEXAMETHASONE SODIUM PHOSPHATE 4 MG/ML
VIAL (ML) INJECTION AS NEEDED
Status: DISCONTINUED | OUTPATIENT
Start: 2022-10-31 | End: 2022-10-31 | Stop reason: SURG

## 2022-10-31 RX ORDER — ATROPINE SULFATE 1 MG/ML
INJECTION, SOLUTION INTRAMUSCULAR; INTRAVENOUS; SUBCUTANEOUS AS NEEDED
Status: DISCONTINUED | OUTPATIENT
Start: 2022-10-31 | End: 2022-10-31 | Stop reason: SURG

## 2022-10-31 RX ORDER — OXYCODONE HYDROCHLORIDE 5 MG/1
5 TABLET ORAL EVERY 4 HOURS PRN
Status: DISCONTINUED | OUTPATIENT
Start: 2022-10-31 | End: 2022-10-31

## 2022-10-31 RX ORDER — ATROPA BELLADONNA AND OPIUM 16.2; 6 MG/1; MG/1
1 SUPPOSITORY RECTAL EVERY 6 HOURS PRN
Status: DISCONTINUED | OUTPATIENT
Start: 2022-10-31 | End: 2022-10-31

## 2022-10-31 RX ORDER — LIDOCAINE HYDROCHLORIDE 20 MG/ML
JELLY TOPICAL AS NEEDED
Status: DISCONTINUED | OUTPATIENT
Start: 2022-10-31 | End: 2022-10-31 | Stop reason: HOSPADM

## 2022-10-31 RX ORDER — PENTOSAN POLYSULFATE SODIUM 100 MG/1
200 CAPSULE, GELATIN COATED ORAL 2 TIMES DAILY
Qty: 1200 CAPSULE | Refills: 5 | Status: SHIPPED | OUTPATIENT
Start: 2022-10-31

## 2022-10-31 RX ORDER — MIDAZOLAM HYDROCHLORIDE 1 MG/ML
INJECTION INTRAMUSCULAR; INTRAVENOUS AS NEEDED
Status: DISCONTINUED | OUTPATIENT
Start: 2022-10-31 | End: 2022-10-31 | Stop reason: SURG

## 2022-10-31 RX ORDER — MORPHINE SULFATE 4 MG/ML
4 INJECTION, SOLUTION INTRAMUSCULAR; INTRAVENOUS EVERY 10 MIN PRN
Status: DISCONTINUED | OUTPATIENT
Start: 2022-10-31 | End: 2022-10-31

## 2022-10-31 RX ORDER — HYDROMORPHONE HYDROCHLORIDE 1 MG/ML
0.2 INJECTION, SOLUTION INTRAMUSCULAR; INTRAVENOUS; SUBCUTANEOUS EVERY 5 MIN PRN
Status: DISCONTINUED | OUTPATIENT
Start: 2022-10-31 | End: 2022-10-31

## 2022-10-31 RX ORDER — MORPHINE SULFATE 4 MG/ML
2 INJECTION, SOLUTION INTRAMUSCULAR; INTRAVENOUS EVERY 10 MIN PRN
Status: DISCONTINUED | OUTPATIENT
Start: 2022-10-31 | End: 2022-10-31

## 2022-10-31 RX ORDER — PROCHLORPERAZINE EDISYLATE 5 MG/ML
5 INJECTION INTRAMUSCULAR; INTRAVENOUS EVERY 8 HOURS PRN
Status: DISCONTINUED | OUTPATIENT
Start: 2022-10-31 | End: 2022-10-31

## 2022-10-31 RX ORDER — GLYCOPYRROLATE 0.2 MG/ML
INJECTION, SOLUTION INTRAMUSCULAR; INTRAVENOUS AS NEEDED
Status: DISCONTINUED | OUTPATIENT
Start: 2022-10-31 | End: 2022-10-31 | Stop reason: SURG

## 2022-10-31 RX ORDER — NALOXONE HYDROCHLORIDE 0.4 MG/ML
80 INJECTION, SOLUTION INTRAMUSCULAR; INTRAVENOUS; SUBCUTANEOUS AS NEEDED
Status: DISCONTINUED | OUTPATIENT
Start: 2022-10-31 | End: 2022-10-31

## 2022-10-31 RX ORDER — ONDANSETRON 2 MG/ML
4 INJECTION INTRAMUSCULAR; INTRAVENOUS EVERY 6 HOURS PRN
Status: DISCONTINUED | OUTPATIENT
Start: 2022-10-31 | End: 2022-10-31

## 2022-10-31 RX ORDER — HYDROMORPHONE HYDROCHLORIDE 1 MG/ML
0.8 INJECTION, SOLUTION INTRAMUSCULAR; INTRAVENOUS; SUBCUTANEOUS EVERY 2 HOUR PRN
Status: CANCELLED | OUTPATIENT
Start: 2022-10-31

## 2022-10-31 RX ORDER — EPHEDRINE SULFATE 50 MG/ML
INJECTION INTRAVENOUS AS NEEDED
Status: DISCONTINUED | OUTPATIENT
Start: 2022-10-31 | End: 2022-10-31 | Stop reason: SURG

## 2022-10-31 RX ORDER — HYDROMORPHONE HYDROCHLORIDE 1 MG/ML
0.4 INJECTION, SOLUTION INTRAMUSCULAR; INTRAVENOUS; SUBCUTANEOUS EVERY 5 MIN PRN
Status: DISCONTINUED | OUTPATIENT
Start: 2022-10-31 | End: 2022-10-31

## 2022-10-31 RX ORDER — ACETAMINOPHEN 500 MG
1000 TABLET ORAL ONCE
Status: DISCONTINUED | OUTPATIENT
Start: 2022-10-31 | End: 2022-10-31 | Stop reason: HOSPADM

## 2022-10-31 RX ORDER — HYDROXYZINE HYDROCHLORIDE 25 MG/1
TABLET, FILM COATED ORAL NIGHTLY
Qty: 90 TABLET | Refills: 5 | Status: SHIPPED | OUTPATIENT
Start: 2022-10-31

## 2022-10-31 RX ORDER — ACETAMINOPHEN 325 MG/1
650 TABLET ORAL
Status: CANCELLED | OUTPATIENT
Start: 2022-10-31

## 2022-10-31 RX ORDER — HYDROCODONE BITARTRATE AND ACETAMINOPHEN 10; 325 MG/1; MG/1
1 TABLET ORAL EVERY 6 HOURS PRN
Qty: 120 TABLET | Refills: 0 | Status: SHIPPED | OUTPATIENT
Start: 2022-10-31

## 2022-10-31 RX ORDER — OXYCODONE HYDROCHLORIDE 5 MG/1
10 TABLET ORAL EVERY 4 HOURS PRN
Status: DISCONTINUED | OUTPATIENT
Start: 2022-10-31 | End: 2022-10-31

## 2022-10-31 RX ORDER — MORPHINE SULFATE 10 MG/ML
6 INJECTION, SOLUTION INTRAMUSCULAR; INTRAVENOUS EVERY 10 MIN PRN
Status: DISCONTINUED | OUTPATIENT
Start: 2022-10-31 | End: 2022-10-31

## 2022-10-31 RX ORDER — ONDANSETRON 2 MG/ML
INJECTION INTRAMUSCULAR; INTRAVENOUS AS NEEDED
Status: DISCONTINUED | OUTPATIENT
Start: 2022-10-31 | End: 2022-10-31 | Stop reason: SURG

## 2022-10-31 RX ORDER — HYDROMORPHONE HYDROCHLORIDE 1 MG/ML
0.4 INJECTION, SOLUTION INTRAMUSCULAR; INTRAVENOUS; SUBCUTANEOUS EVERY 2 HOUR PRN
Status: CANCELLED | OUTPATIENT
Start: 2022-10-31

## 2022-10-31 RX ORDER — BUPIVACAINE HCL/0.9 % NACL/PF 0.25 %
5 PLASTIC BAG, INJECTION (ML) EPIDURAL ONCE AS NEEDED
Status: DISCONTINUED | OUTPATIENT
Start: 2022-10-31 | End: 2022-10-31

## 2022-10-31 RX ORDER — PHENAZOPYRIDINE HYDROCHLORIDE 200 MG/1
200 TABLET, FILM COATED ORAL ONCE AS NEEDED
Status: CANCELLED | OUTPATIENT
Start: 2022-10-31 | End: 2022-10-31

## 2022-10-31 RX ORDER — PHENAZOPYRIDINE HYDROCHLORIDE 95 MG/1
95 TABLET ORAL 3 TIMES DAILY PRN
Qty: 21 TABLET | Refills: 6 | Status: SHIPPED | OUTPATIENT
Start: 2022-10-31

## 2022-10-31 RX ORDER — LIDOCAINE HYDROCHLORIDE 10 MG/ML
INJECTION, SOLUTION EPIDURAL; INFILTRATION; INTRACAUDAL; PERINEURAL AS NEEDED
Status: DISCONTINUED | OUTPATIENT
Start: 2022-10-31 | End: 2022-10-31 | Stop reason: SURG

## 2022-10-31 RX ORDER — HYDROMORPHONE HYDROCHLORIDE 1 MG/ML
0.6 INJECTION, SOLUTION INTRAMUSCULAR; INTRAVENOUS; SUBCUTANEOUS EVERY 5 MIN PRN
Status: DISCONTINUED | OUTPATIENT
Start: 2022-10-31 | End: 2022-10-31

## 2022-10-31 RX ORDER — LEVOFLOXACIN 500 MG/1
500 TABLET, FILM COATED ORAL DAILY
Qty: 10 TABLET | Refills: 1 | Status: SHIPPED | OUTPATIENT
Start: 2022-10-31 | End: 2022-11-10

## 2022-10-31 RX ADMIN — ONDANSETRON 4 MG: 2 INJECTION INTRAMUSCULAR; INTRAVENOUS at 11:57:00

## 2022-10-31 RX ADMIN — LIDOCAINE HYDROCHLORIDE 50 MG: 10 INJECTION, SOLUTION EPIDURAL; INFILTRATION; INTRACAUDAL; PERINEURAL at 11:44:00

## 2022-10-31 RX ADMIN — MIDAZOLAM HYDROCHLORIDE 2 MG: 1 INJECTION INTRAMUSCULAR; INTRAVENOUS at 11:40:00

## 2022-10-31 RX ADMIN — GLYCOPYRROLATE 0.2 MG: 0.2 INJECTION, SOLUTION INTRAMUSCULAR; INTRAVENOUS at 11:52:00

## 2022-10-31 RX ADMIN — EPHEDRINE SULFATE 10 MG: 50 INJECTION INTRAVENOUS at 11:55:00

## 2022-10-31 RX ADMIN — SODIUM CHLORIDE, SODIUM LACTATE, POTASSIUM CHLORIDE, CALCIUM CHLORIDE: 600; 310; 30; 20 INJECTION, SOLUTION INTRAVENOUS at 11:39:00

## 2022-10-31 RX ADMIN — DEXAMETHASONE SODIUM PHOSPHATE 4 MG: 4 MG/ML VIAL (ML) INJECTION at 11:50:00

## 2022-10-31 RX ADMIN — EPHEDRINE SULFATE 10 MG: 50 INJECTION INTRAVENOUS at 12:01:00

## 2022-10-31 RX ADMIN — ATROPINE SULFATE 0.5 MG: 1 INJECTION, SOLUTION INTRAMUSCULAR; INTRAVENOUS; SUBCUTANEOUS at 12:10:00

## 2022-10-31 NOTE — BRIEF OP NOTE
[facility] POST ANESTHESIA CARE UNIT  Brief Op Note       Patients Name: Ladonna Shabazz. Attending Physician: Kusum Hernandez MD  Operating Physician: Kyler Balbuena MD  CSN: [id number]     Location:  OR  MRN: [id number]    YOB: 1960  Admission Date: 10/31/2022  Operation Date: 10/31/2022    Pre-Operative Diagnosis: Obstructive uropathy, neurogenic bladder, irritative voiding symptoms, frequency, pelvic discomfort, prostate cancer, bilateral kidney stone    Post-Operative Diagnosis: Same    Procedure Performed: Cystoscopy, bilateral retrograde pyelogram,  hydrodistension of bladder, bladder biopsy, fulguration, removal of bladder lesion, prostate exam fluoroscopy      Primary Surgeon:  Kyler Balbuena MD    Assistants: None    Anesthesia: General  Anesthesiologist [facility]: Munira Cunha, DO  And local  Findings: Urethra normal.  Prostatic fossa with mild irritation and partially obstructing the verumontanum distance of 3 cm due to very mild bilateral enlargement. Bladder showed a small nodule at the mid trigone could be prostatic tissue. Residual was approximately 100 cc. Bilateral retrograde pyelogram was normal the known kidney stones were not well seen. Seeds were seen nicely grouped in the prostate. Rectal exam revealed a 10 g prostate which was smooth, no nodules, symmetric. There were no rectal masses and the stool was brown. EBL: 0 mL    Complications: None    Specimens:    ID Type Source Tests Collected by Time Destination   1 : 1.  Mid trigone biopsy- look for mast cells  Tissue Tissue SURGICAL PATHOLOGY TISSUE Kusum Hernandez MD 10/31/2022 1215        Condition:  stable    Kyler Balbuena MD  10/31/2022  12:35 PM

## 2022-10-31 NOTE — ANESTHESIA PROCEDURE NOTES
Airway  Date/Time: 10/31/2022 11:46 AM  Urgency: Elective    Airway not difficult    General Information and Staff    Patient location during procedure: OR  Anesthesiologist: Linda Sanford DO  Performed: anesthesiologist     Indications and Patient Condition  Indications for airway management: anesthesia  Spontaneous Ventilation: absent  Sedation level: deep  Preoxygenated: yes  Patient position: sniffing  Mask difficulty assessment: 1 - vent by mask    Final Airway Details  Final airway type: supraglottic airway      Successful airway: classic  Size 5       Number of attempts at approach: 1  Number of other approaches attempted: 0

## 2022-10-31 NOTE — DISCHARGE INSTRUCTIONS
There is a line that will indicate which way the catheter should be facing. The ridge should be facing up so that it can get around the prostate. Dr. Iva Jaquez suggests a half sitting half standing position while trying to relax your bladder. Make sure to wash your hands, apply betadine to the tip of your penis, and lubricate the catheter with the jelly. Insert the catheter until you see urine draining and leave it in until it stops draining. Make sure the catheter is still facing the correct way and gently slide it out. Redo at 10pm before bed and when you wake up. Continue until you're able to urinate on your own. HOME INSTRUCTIONS  [facility]BS[facility]G HOME CARE INSTRUCTIONS: POST-OP ANESTHESIA  The medication that you received for sedation or general anesthesia can last up to 24 hours. Your judgment and reflexes may be altered, even if you feel like your normal self. We Recommend:   Do not drive any motor vehicle or bicycle   Avoid mowing the lawn, playing sports, or working with power tools/applicances (power saws, electric knives or mixers)   That you have someone stay with you on your first night home   Do not drink alcohol or take sleeping pills or tranquilizers   Do not sign legal documents within 24 hours of your procedure   If you had a nerve block for your surgery, take extra care not to put any pressure on your arm or hand for 24 hours    It is normal:  For you to have a sore throat if you had a breathing tube during surgery (while you were asleep!). The sore throat should get better within 48 hours. You can gargle with warm salt water (1/2 tsp in 4 oz warm water) or use a throat lozenge for comfort  To feel muscle aches or soreness especially in the abdomen, chest or neck. The achy feeling should go away in the next 24 hours  To feel weak, sleepy or \"wiped out\". Your should start feeling better in the next 24 hours.    To experience mild discomforts such as sore lip or tongue, headache, cramps, gas pains or a bloated feeling in your abdomen. To experience mild back pain or soreness for a day or two if you had spinal or epidural anesthesia. If you had laparoscopic surgery, to feel shoulder pain or discomfort on the day of surgery. For some patients to have nausea after surgery/anesthesia    If you feel nausea or experience vomiting:   Try to move around less.    Eat less than usual or drink only liquids until the next morning   Nausea should resolve in about 24 hours    If you have a problem when you are at home:    Call your surgeons office

## 2022-11-01 NOTE — OPERATIVE REPORT
[facility]    PATIENT'S NAME: Gary Wills   ATTENDING PHYSICIAN: Jose Ackerman MD   OPERATING PHYSICIAN: Jose Ackerman MD   PATIENT ACCOUNT#:   [de-identified]    LOCATION:  [place] [address]  MEDICAL RECORD #:   [id number]       YOB: 1960  ADMISSION DATE:       10/31/2022      OPERATION DATE:  10/31/2022    OPERATIVE REPORT      PREOPERATIVE DIAGNOSIS:  Obstructive uropathy, neurogenic bladder, irritative voiding symptoms, frequency, pelvic discomfort, prostate cancer, and bilateral kidney stones. POSTOPERATIVE DIAGNOSIS:  Obstructive uropathy, neurogenic bladder, irritative voiding symptoms, frequency, pelvic discomfort, prostate cancer, and bilateral kidney stones, with small lesion at mid trigone abutting the bladder neck. PROCEDURE:  Cystoscopy, bilateral retrograde pyelograms, fluoroscopy, hydrodistention of bladder, bladder biopsy, fulguration, and removal of bladder lesion and rectal exam.    ANESTHESIA:  General and local.    ESTIMATED BLOOD LOSS:  Zero mL. COMPLICATIONS:  None. INDICATIONS:  This patient is a 70-year-old male who has had irritative voiding symptoms, including frequency, urgency, and nocturia for 30 years. He has been on Ditropan he thinks for about 30 years. He had a seed implantation 6 months ago. He states now he is urinating about every hour at night and about every hour during the day. He has had an ultrasound of his kidneys, a KUB, and a CT urogram.  This shows multiple mild abnormalities, probably not related to his symptoms. The CT says he has a 4 mm right upper pole kidney stone, nonobstructing, with bilateral tiny kidney stones. The ultrasound says there is a 0.84 mm left kidney stone, but no other stones or hydronephrosis seen. The residual is low. The KUB shows a tiny right kidney stone. The KUB today did not show any stones. For details, see my dictated history and physical.  His PSA is coming down nicely.   He takes Myrbetriq intermittently. He takes Ditropan intermittently. FINDINGS:  Urethra was normal.  Prostatic fossa showed very mild irritation, partially obstructing verumontanum distance of 3 cm due to very mild enlargement, probably not significant. Bladder showed a small nodule at the mid trigone adjacent to the bladder neck. Could be prostatic tissue. Residual urine was approximately 100 mL, this may have not been a post-void residual.  Bilateral retrograde pyelograms were normal.  The known bilateral kidney stones were not well seen. There was no obstruction. Seeds were seen nicely grouped in the prostate. Rectal exam revealed a 10-gram prostate, smooth, no nodules, symmetric. There was no rectal mass and the stool was brown. OPERATIVE TECHNIQUE:  Patient was brought to the operating theater. He took his antibiotics at home. He was carefully given general anesthesia, then prepped and draped in the usual sterile fashion in semi-lithotomy position. Next, we did  x-rays which showed seeds to be nicely grouped. No definite calcifications were seen over the kidneys or ureters. Next, we inserted the 22-[place] panendoscope with 30-degree lens videoscope. The urethra was entered and was normal.  There were no strictures. The prostatic fossa was visualized, not significantly obstructing. There was partial obstruction, verumontanum distance of 3 cm due to very mild bilobar enlargement. There was very mild irritation, but no significant abnormalities. No sees were seen. The bladder was entered. The bladder was emptied. There was 100 mL clear yellow residual urine. The bladder was carefully surveilled with the 30-degree and 70-degree lenses. Both ureteral orifices were identified, effluxing clear urine. There was no evidence of bleeding, blood clot or stones. There was a small, tiny lesion right at the mid trigone adjacent to the bladder neck, maybe just prostatic tissue.   This was eventually removed and fulgurated. Firstly, we used a cone tip catheter with the contrast mixed gentamicin. We cannulated the left ureteral orifice and then right. We instilled contrast gently. This opacified the system well, revealing no filling defects or abnormalities. The known stones were not demonstrated on today's study. Next we did a gentle hydrodistention of the bladder with the water bag at approximately 85 cm of height from the bladder, and this filled to about 500. We emptied and redid another fill to about 550, so the bladder capacity under anesthesia is slightly decreased. There were no significant glomerulations or Hunters ulcers seen after the hydrodistention. We then used the rigid biopsy forceps and removed the lesion and biopsied it that was at the mid trigone adjacent to the bladder neck. We then used the Bugbee cautery and cauterized this to get good hemostasis. We used fluoroscopy throughout the procedure to not overfill or underfill the system. The rest of the bladder looked fine. We emptied the bladder and we removed the scope. There was excellent hemostasis. At this point, we put 2% lidocaine gel per urethra and put a penile clamp. This was left for about 10 minutes while the patient woke up and then removed. At this point, the patient wanted me to do a rectal exam while he was asleep, so we did a rectal exam.  He had a 10 g prostate which was smooth, no nodules, symmetric. There were no rectal masses and the stool was brown. The patient tolerated the procedure well and was transferred to the recovery room in stable condition. FINAL ASSESSMENT:  No definite anatomical abnormalities that would explain the patient's frequency, urgency and nocturia. There were no seeds. The patient has very mild irritation of the prostate. This may be physiologic and he had the small lesion. PLAN:  Will be to check the biopsy results.   We will empirically give him Levaquin which he prefers over other antibiotics which bother him. I did warn him about Achilles tendon rupture and tendon issues. We are going to empirically give him a 10-day course of Levaquin for presumed possible prostatitis. I would also consider treating him for interstitial cystitis with Elmiron 200 mg twice a day, and then Hydroxy hydrochloride 25 mg at bedtime. If he has continued problems, he can seek a second opinion at a [facility] medical center. He may have a neurologic bladder and this may be due to nerve impingement from his low back, and I recommend that he follow up with his neurosurgeon, with a neurologist, and possibly with a rehab doctor for back issues to see if there could be anything done for his low back, which may help his bladder. We can also treat him medically with Ditropan XL, and increase his dose of Myrbetriq to 50 mg per day. We can also consider trying a Botox injection. We could also refer him for possibly a sacral nerve stimulator after further neurologic evaluation of his bladder and low back. The patient will follow up with me in the office and we will check his biopsy results. He does not want to have treatment for his kidney stones, but he should have a metabolic stone work up to work on prevention. Dictated By Leatha Escalera MD  d: 10/31/2022 12:45:59  t: 10/31/2022 22:00:06  [facility] [id number]  [id number]/    cc: Brandon Miller.  Sergio Guillen MD

## 2022-11-13 ENCOUNTER — HOSPITAL ENCOUNTER (OUTPATIENT)
Age: 62
Discharge: HOME | End: 2022-11-13
Attending: EMERGENCY MEDICINE
Payer: COMMERCIAL

## 2022-11-13 VITALS
OXYGEN SATURATION: 97 % | DIASTOLIC BLOOD PRESSURE: 74 MMHG | SYSTOLIC BLOOD PRESSURE: 112 MMHG | TEMPERATURE: 98 F | RESPIRATION RATE: 20 BRPM | HEART RATE: 60 BPM

## 2022-11-13 DIAGNOSIS — J11.1 INFLUENZA: Primary | ICD-10-CM

## 2022-11-13 LAB
POCT INFLUENZA A: POSITIVE
POCT INFLUENZA B: NEGATIVE

## 2022-11-13 PROCEDURE — 87502 INFLUENZA DNA AMP PROBE: CPT | Performed by: EMERGENCY MEDICINE

## 2022-11-13 PROCEDURE — 99214 OFFICE O/P EST MOD 30 MIN: CPT

## 2022-11-13 PROCEDURE — 99213 OFFICE O/P EST LOW 20 MIN: CPT

## 2022-11-13 RX ORDER — BENZONATATE 100 MG/1
100 CAPSULE ORAL 3 TIMES DAILY PRN
Qty: 30 CAPSULE | Refills: 0 | Status: SHIPPED | OUTPATIENT
Start: 2022-11-13 | End: 2022-12-13

## 2022-11-13 RX ORDER — ALBUTEROL SULFATE 90 UG/1
2 AEROSOL, METERED RESPIRATORY (INHALATION) EVERY 4 HOURS PRN
Qty: 1 EACH | Refills: 0 | Status: SHIPPED | OUTPATIENT
Start: 2022-11-13 | End: 2022-12-13

## 2022-12-20 RX ORDER — HYDROCODONE BITARTRATE AND ACETAMINOPHEN 10; 325 MG/1; MG/1
1 TABLET ORAL EVERY 6 HOURS PRN
Qty: 120 TABLET | Refills: 0 | Status: SHIPPED | OUTPATIENT
Start: 2022-12-20

## 2022-12-20 RX ORDER — NALOXONE HYDROCHLORIDE 4 MG/.1ML
4 SPRAY NASAL AS NEEDED
Qty: 1 KIT | Refills: 0 | Status: SHIPPED | OUTPATIENT
Start: 2022-12-20

## 2022-12-20 NOTE — PROGRESS NOTES
Per Dr. Augustine Phoenix, pt is having hernia surgery tomorrow and Dr. Augustine Phoenix is asking me to e-prescribe Norco 10/325 #120 tablets to [facility] in [facility] on [address].

## 2022-12-21 ENCOUNTER — LAB REQUISITION (OUTPATIENT)
Dept: SURGERY | Age: 62
End: 2022-12-21
Payer: COMMERCIAL

## 2022-12-21 DIAGNOSIS — K40.90 INGUINAL HERNIA: ICD-10-CM

## 2022-12-21 PROCEDURE — 88304 TISSUE EXAM BY PATHOLOGIST: CPT | Performed by: SURGERY

## 2023-02-02 ENCOUNTER — TELEPHONE (OUTPATIENT)
Dept: PAIN CLINIC | Facility: HOSPITAL | Age: 63
End: 2023-02-02

## 2023-02-02 RX ORDER — CELECOXIB 200 MG/1
200 CAPSULE ORAL 2 TIMES DAILY
Qty: 60 CAPSULE | Refills: 1 | Status: SHIPPED | OUTPATIENT
Start: 2023-02-02 | End: 2023-03-04

## 2023-02-02 RX ORDER — HYDROCODONE BITARTRATE AND ACETAMINOPHEN 10; 325 MG/1; MG/1
1 TABLET ORAL EVERY 6 HOURS PRN
Qty: 120 TABLET | Refills: 0 | Status: SHIPPED | OUTPATIENT
Start: 2023-03-04 | End: 2023-04-03

## 2023-02-02 RX ORDER — HYDROCODONE BITARTRATE AND ACETAMINOPHEN 10; 325 MG/1; MG/1
1 TABLET ORAL EVERY 6 HOURS PRN
Qty: 120 TABLET | Refills: 0 | Status: SHIPPED | OUTPATIENT
Start: 2023-02-02 | End: 2023-03-04

## 2023-02-03 ENCOUNTER — TELEPHONE (OUTPATIENT)
Dept: PAIN CLINIC | Facility: HOSPITAL | Age: 63
End: 2023-02-03

## 2023-02-03 RX ORDER — CELECOXIB 200 MG/1
200 CAPSULE ORAL 2 TIMES DAILY
Qty: 60 CAPSULE | Refills: 1 | Status: SHIPPED | OUTPATIENT
Start: 2023-02-03 | End: 2023-03-05

## 2023-02-03 RX ORDER — HYDROCODONE BITARTRATE AND ACETAMINOPHEN 10; 325 MG/1; MG/1
1 TABLET ORAL EVERY 6 HOURS PRN
Qty: 120 TABLET | Refills: 0 | Status: SHIPPED | OUTPATIENT
Start: 2023-02-03 | End: 2023-03-05

## 2023-03-27 RX ORDER — HYDROCODONE BITARTRATE AND ACETAMINOPHEN 10; 325 MG/1; MG/1
1 TABLET ORAL EVERY 6 HOURS PRN
Qty: 120 TABLET | Refills: 0 | Status: SHIPPED | OUTPATIENT
Start: 2023-03-27

## 2023-03-27 NOTE — PROGRESS NOTES
Per Dr. Ashley Figueroa, provide refill of Ledbetter. Patient to schedule an appointment soon. Chart and IL- reviewed.

## 2023-04-13 ENCOUNTER — OFFICE VISIT (OUTPATIENT)
Dept: PAIN CLINIC | Facility: HOSPITAL | Age: 63
End: 2023-04-13
Attending: ANESTHESIOLOGY

## 2023-04-13 VITALS
WEIGHT: 210 LBS | SYSTOLIC BLOOD PRESSURE: 124 MMHG | BODY MASS INDEX: 27.83 KG/M2 | RESPIRATION RATE: 18 BRPM | HEIGHT: 73 IN | HEART RATE: 79 BPM | DIASTOLIC BLOOD PRESSURE: 80 MMHG

## 2023-04-13 DIAGNOSIS — G62.9 NEUROPATHY: ICD-10-CM

## 2023-04-13 DIAGNOSIS — Z79.891 ENCOUNTER FOR MONITORING OPIOID MAINTENANCE THERAPY: Primary | ICD-10-CM

## 2023-04-13 DIAGNOSIS — Z51.81 ENCOUNTER FOR MONITORING OPIOID MAINTENANCE THERAPY: Primary | ICD-10-CM

## 2023-04-13 DIAGNOSIS — M54.40 CHRONIC LOW BACK PAIN WITH SCIATICA, SCIATICA LATERALITY UNSPECIFIED, UNSPECIFIED BACK PAIN LATERALITY: ICD-10-CM

## 2023-04-13 DIAGNOSIS — G89.29 CHRONIC LOW BACK PAIN WITH SCIATICA, SCIATICA LATERALITY UNSPECIFIED, UNSPECIFIED BACK PAIN LATERALITY: ICD-10-CM

## 2023-04-13 LAB
AMPHET UR QL SCN: NEGATIVE
BARBITURATES UR QL SCN: NEGATIVE
BENZODIAZ UR QL SCN: NEGATIVE
CANNABINOIDS UR QL SCN: NEGATIVE
COCAINE UR QL: NEGATIVE
CREAT UR-SCNC: 128 MG/DL
MDMA UR QL SCN: NEGATIVE
METHADONE UR QL SCN: NEGATIVE
OXYCODONE UR QL SCN: NEGATIVE
PCP UR QL SCN: NEGATIVE

## 2023-04-13 PROCEDURE — 80307 DRUG TEST PRSMV CHEM ANLYZR: CPT | Performed by: ANESTHESIOLOGY

## 2023-04-13 PROCEDURE — 99211 OFF/OP EST MAY X REQ PHY/QHP: CPT

## 2023-04-13 PROCEDURE — 80361 OPIATES 1 OR MORE: CPT | Performed by: ANESTHESIOLOGY

## 2023-04-13 RX ORDER — HYDROCODONE BITARTRATE AND ACETAMINOPHEN 10; 325 MG/1; MG/1
1 TABLET ORAL EVERY 6 HOURS PRN
Qty: 120 TABLET | Refills: 0 | Status: SHIPPED | OUTPATIENT
Start: 2023-05-13 | End: 2023-06-12

## 2023-04-13 RX ORDER — PREGABALIN 75 MG/1
75 CAPSULE ORAL 2 TIMES DAILY
Qty: 60 CAPSULE | Refills: 2 | Status: SHIPPED | OUTPATIENT
Start: 2023-04-13 | End: 2023-05-13

## 2023-04-13 RX ORDER — CELECOXIB 200 MG/1
200 CAPSULE ORAL 2 TIMES DAILY
Qty: 60 CAPSULE | Refills: 2 | Status: SHIPPED | OUTPATIENT
Start: 2023-04-13 | End: 2023-05-13

## 2023-04-13 RX ORDER — HYDROCODONE BITARTRATE AND ACETAMINOPHEN 10; 325 MG/1; MG/1
1 TABLET ORAL EVERY 6 HOURS PRN
Qty: 120 TABLET | Refills: 0 | Status: SHIPPED | OUTPATIENT
Start: 2023-04-13 | End: 2023-05-13

## 2023-04-13 NOTE — PROGRESS NOTES
4/13/2023--presents ambulatory to [facility];  Medical management chronic lt shldr pain; Today pt c/o pain radiating down the posterior ble into the bilateral feeti;  States this has progressively gotten worse; rating pain 5-9/10; Pt states he wakes up at night and u/a to ambulate d/u pain, numbness  In his feet; having increased edema in his lower legs now using compression stockings;  \"I have a few things I want to discuss with the MD; would like to discuss his medications; Seen by Saniya Garibay; Refer to dictation for the continued plan of care.

## 2023-04-19 LAB
CODEINE UR: NEGATIVE
HYDROCODONE CONF UR: 1425 NG/ML
HYDROCODONE UR: POSITIVE
HYDROMORPH CONF UR: 203 NG/ML
HYDROMORPH UR: POSITIVE
MORPHINE UR: NEGATIVE
OPIATES CLASS UR: POSITIVE NG/ML

## 2023-05-18 ENCOUNTER — TELEPHONE (OUTPATIENT)
Dept: PAIN CLINIC | Facility: HOSPITAL | Age: 63
End: 2023-05-18

## 2023-05-18 RX ORDER — HYDROCODONE BITARTRATE AND ACETAMINOPHEN 10; 325 MG/1; MG/1
1 TABLET ORAL EVERY 6 HOURS PRN
Qty: 120 TABLET | Refills: 0 | Status: SHIPPED | OUTPATIENT
Start: 2023-05-18 | End: 2023-06-17

## 2023-05-18 RX ORDER — HYDROCODONE BITARTRATE AND ACETAMINOPHEN 10; 325 MG/1; MG/1
1 TABLET ORAL EVERY 6 HOURS PRN
Qty: 120 TABLET | Refills: 0 | Status: SHIPPED | OUTPATIENT
Start: 2023-06-17 | End: 2023-07-17

## 2023-06-15 RX ORDER — HYDROCODONE BITARTRATE AND ACETAMINOPHEN 10; 325 MG/1; MG/1
1 TABLET ORAL EVERY 6 HOURS PRN
Qty: 120 TABLET | Refills: 0 | Status: SHIPPED | OUTPATIENT
Start: 2023-06-15 | End: 2023-07-15

## 2023-06-15 RX ORDER — DULOXETIN HYDROCHLORIDE 30 MG/1
30 CAPSULE, DELAYED RELEASE ORAL DAILY
Qty: 30 CAPSULE | Refills: 0 | Status: SHIPPED | OUTPATIENT
Start: 2023-06-15 | End: 2023-07-15

## 2023-06-15 RX ORDER — HYDROCODONE BITARTRATE AND ACETAMINOPHEN 10; 325 MG/1; MG/1
1 TABLET ORAL EVERY 6 HOURS PRN
Qty: 120 TABLET | Refills: 0 | Status: SHIPPED | OUTPATIENT
Start: 2023-07-15 | End: 2023-08-14

## 2023-06-15 NOTE — TELEPHONE ENCOUNTER
Alfredito's back pain is increasingly worse as well as neck pain discussed management he is tried gabapentin and Lyrica in the past were going to try Cymbalta on him as well as renewing his Norco he will follow-up with us with office visit in a couple months he did come by the office today

## 2023-07-17 RX ORDER — CELECOXIB 200 MG/1
200 CAPSULE ORAL 2 TIMES DAILY
Qty: 60 CAPSULE | Refills: 2 | Status: SHIPPED | OUTPATIENT
Start: 2023-07-17 | End: 2023-10-15

## 2023-08-09 ENCOUNTER — TELEPHONE (OUTPATIENT)
Dept: PAIN CLINIC | Facility: HOSPITAL | Age: 63
End: 2023-08-09

## 2023-08-09 RX ORDER — GABAPENTIN 300 MG/1
300 CAPSULE ORAL 3 TIMES DAILY
Qty: 90 CAPSULE | Refills: 0 | Status: SHIPPED | OUTPATIENT
Start: 2023-08-09 | End: 2023-09-08

## 2023-08-09 RX ORDER — HYDROCODONE BITARTRATE AND ACETAMINOPHEN 10; 325 MG/1; MG/1
1 TABLET ORAL EVERY 6 HOURS PRN
Qty: 120 TABLET | Refills: 0 | Status: SHIPPED | OUTPATIENT
Start: 2023-08-09 | End: 2023-09-08

## 2023-09-06 ENCOUNTER — LAB ENCOUNTER (OUTPATIENT)
Dept: LAB | Facility: HOSPITAL | Age: 63
End: 2023-09-06
Attending: INTERNAL MEDICINE
Payer: COMMERCIAL

## 2023-09-06 DIAGNOSIS — Z00.00 ROUTINE GENERAL MEDICAL EXAMINATION AT A HEALTH CARE FACILITY: Primary | ICD-10-CM

## 2023-09-06 LAB
ALBUMIN SERPL-MCNC: 3.5 G/DL (ref 3.4–5)
ALBUMIN/GLOB SERPL: 1.3 {RATIO} (ref 1–2)
ALP LIVER SERPL-CCNC: 80 U/L
ALT SERPL-CCNC: 29 U/L
ANION GAP SERPL CALC-SCNC: 7 MMOL/L (ref 0–18)
AST SERPL-CCNC: 21 U/L (ref 15–37)
BASOPHILS # BLD AUTO: 0.05 X10(3) UL (ref 0–0.2)
BASOPHILS NFR BLD AUTO: 0.5 %
BILIRUB SERPL-MCNC: 0.4 MG/DL (ref 0.1–2)
BILIRUB UR QL: NEGATIVE
BUN BLD-MCNC: 14 MG/DL (ref 7–18)
BUN/CREAT SERPL: 11.9 (ref 10–20)
CALCIUM BLD-MCNC: 9.1 MG/DL (ref 8.5–10.1)
CHLORIDE SERPL-SCNC: 110 MMOL/L (ref 98–112)
CHOLEST SERPL-MCNC: 199 MG/DL (ref ?–200)
CLARITY UR: CLEAR
CO2 SERPL-SCNC: 23 MMOL/L (ref 21–32)
COLOR UR: YELLOW
COMPLEXED PSA SERPL-MCNC: 1 NG/ML (ref ?–4)
CREAT BLD-MCNC: 1.18 MG/DL
CREAT UR-SCNC: 154 MG/DL
DEPRECATED RDW RBC AUTO: 46.7 FL (ref 35.1–46.3)
EGFRCR SERPLBLD CKD-EPI 2021: 69 ML/MIN/1.73M2 (ref 60–?)
EOSINOPHIL # BLD AUTO: 0.12 X10(3) UL (ref 0–0.7)
EOSINOPHIL NFR BLD AUTO: 1.2 %
ERYTHROCYTE [DISTWIDTH] IN BLOOD BY AUTOMATED COUNT: 15.4 % (ref 11–15)
EST. AVERAGE GLUCOSE BLD GHB EST-MCNC: 120 MG/DL (ref 68–126)
FASTING PATIENT LIPID ANSWER: YES
FASTING STATUS PATIENT QL REPORTED: YES
GLOBULIN PLAS-MCNC: 2.8 G/DL (ref 2.8–4.4)
GLUCOSE BLD-MCNC: 93 MG/DL (ref 70–99)
GLUCOSE UR-MCNC: NORMAL MG/DL
HBA1C MFR BLD: 5.8 % (ref ?–5.7)
HCT VFR BLD AUTO: 44.1 %
HDLC SERPL-MCNC: 36 MG/DL (ref 40–59)
HGB BLD-MCNC: 14.3 G/DL
HGB UR QL STRIP.AUTO: NEGATIVE
IMM GRANULOCYTES # BLD AUTO: 0.03 X10(3) UL (ref 0–1)
IMM GRANULOCYTES NFR BLD: 0.3 %
KETONES UR-MCNC: NEGATIVE MG/DL
LDLC SERPL CALC-MCNC: 125 MG/DL (ref ?–100)
LEUKOCYTE ESTERASE UR QL STRIP.AUTO: NEGATIVE
LYMPHOCYTES # BLD AUTO: 1.56 X10(3) UL (ref 1–4)
LYMPHOCYTES NFR BLD AUTO: 15.3 %
MCH RBC QN AUTO: 27 PG (ref 26–34)
MCHC RBC AUTO-ENTMCNC: 32.4 G/DL (ref 31–37)
MCV RBC AUTO: 83.2 FL
MICROALBUMIN UR-MCNC: 0.96 MG/DL
MICROALBUMIN/CREAT 24H UR-RTO: 6.2 UG/MG (ref ?–30)
MONOCYTES # BLD AUTO: 0.65 X10(3) UL (ref 0.1–1)
MONOCYTES NFR BLD AUTO: 6.4 %
NEUTROPHILS # BLD AUTO: 7.8 X10 (3) UL (ref 1.5–7.7)
NEUTROPHILS # BLD AUTO: 7.8 X10(3) UL (ref 1.5–7.7)
NEUTROPHILS NFR BLD AUTO: 76.3 %
NITRITE UR QL STRIP.AUTO: NEGATIVE
NONHDLC SERPL-MCNC: 163 MG/DL (ref ?–130)
OSMOLALITY SERPL CALC.SUM OF ELEC: 290 MOSM/KG (ref 275–295)
PH UR: 6.5 [PH] (ref 5–8)
PLATELET # BLD AUTO: 203 10(3)UL (ref 150–450)
POTASSIUM SERPL-SCNC: 3.8 MMOL/L (ref 3.5–5.1)
PROT SERPL-MCNC: 6.3 G/DL (ref 6.4–8.2)
PROT UR-MCNC: NEGATIVE MG/DL
RBC # BLD AUTO: 5.3 X10(6)UL
SODIUM SERPL-SCNC: 140 MMOL/L (ref 136–145)
SP GR UR STRIP: 1.02 (ref 1–1.03)
TRIGL SERPL-MCNC: 212 MG/DL (ref 30–149)
TSI SER-ACNC: 1.69 MIU/ML (ref 0.36–3.74)
UROBILINOGEN UR STRIP-ACNC: NORMAL
VLDLC SERPL CALC-MCNC: 38 MG/DL (ref 0–30)
WBC # BLD AUTO: 10.2 X10(3) UL (ref 4–11)

## 2023-09-06 PROCEDURE — 36415 COLL VENOUS BLD VENIPUNCTURE: CPT

## 2023-09-06 PROCEDURE — 83036 HEMOGLOBIN GLYCOSYLATED A1C: CPT

## 2023-09-06 PROCEDURE — 82043 UR ALBUMIN QUANTITATIVE: CPT

## 2023-09-06 PROCEDURE — 80053 COMPREHEN METABOLIC PANEL: CPT

## 2023-09-06 PROCEDURE — 84443 ASSAY THYROID STIM HORMONE: CPT

## 2023-09-06 PROCEDURE — 82570 ASSAY OF URINE CREATININE: CPT

## 2023-09-06 PROCEDURE — 85025 COMPLETE CBC W/AUTO DIFF WBC: CPT

## 2023-09-06 PROCEDURE — 80061 LIPID PANEL: CPT

## 2023-09-13 ENCOUNTER — TELEPHONE (OUTPATIENT)
Dept: PAIN CLINIC | Facility: HOSPITAL | Age: 63
End: 2023-09-13

## 2023-09-13 RX ORDER — METHYLPREDNISOLONE 4 MG/1
TABLET ORAL
Qty: 21 TABLET | Refills: 0 | Status: SHIPPED | OUTPATIENT
Start: 2023-09-13

## 2023-09-27 ENCOUNTER — TELEPHONE (OUTPATIENT)
Dept: PAIN CLINIC | Facility: HOSPITAL | Age: 63
End: 2023-09-27

## 2023-09-27 RX ORDER — HYDROCODONE BITARTRATE AND ACETAMINOPHEN 10; 325 MG/1; MG/1
1 TABLET ORAL EVERY 6 HOURS PRN
Qty: 120 TABLET | Refills: 0 | Status: SHIPPED | OUTPATIENT
Start: 2023-09-27 | End: 2023-10-27

## 2023-09-27 NOTE — TELEPHONE ENCOUNTER
Pt needs refill on script will come in in next 2 months   Doing well meds still working   No side effects

## 2023-09-29 ENCOUNTER — TELEPHONE (OUTPATIENT)
Dept: PAIN CLINIC | Facility: HOSPITAL | Age: 63
End: 2023-09-29

## 2023-09-29 RX ORDER — HYDROCODONE BITARTRATE AND ACETAMINOPHEN 7.5; 325 MG/1; MG/1
1 TABLET ORAL EVERY 6 HOURS PRN
Qty: 120 TABLET | Refills: 0 | Status: SHIPPED | OUTPATIENT
Start: 2023-09-29 | End: 2023-10-29

## 2023-11-16 ENCOUNTER — OFFICE VISIT (OUTPATIENT)
Dept: PAIN CLINIC | Facility: HOSPITAL | Age: 63
End: 2023-11-16
Attending: ANESTHESIOLOGY
Payer: COMMERCIAL

## 2023-11-16 VITALS — OXYGEN SATURATION: 97 % | SYSTOLIC BLOOD PRESSURE: 126 MMHG | DIASTOLIC BLOOD PRESSURE: 84 MMHG | HEART RATE: 65 BPM

## 2023-11-16 DIAGNOSIS — M75.101 ROTATOR CUFF TEAR ARTHROPATHY OF RIGHT SHOULDER: ICD-10-CM

## 2023-11-16 DIAGNOSIS — M96.1 FAILED BACK SYNDROME OF LUMBAR SPINE: ICD-10-CM

## 2023-11-16 DIAGNOSIS — M54.40 CHRONIC LOW BACK PAIN WITH SCIATICA, SCIATICA LATERALITY UNSPECIFIED, UNSPECIFIED BACK PAIN LATERALITY: ICD-10-CM

## 2023-11-16 DIAGNOSIS — M54.16 LEFT LUMBAR RADICULOPATHY: ICD-10-CM

## 2023-11-16 DIAGNOSIS — M54.50 MYOFASCIAL LOW BACK PAIN: Primary | ICD-10-CM

## 2023-11-16 DIAGNOSIS — M75.90 BURSITIS AND TENDINITIS OF SHOULDER REGION: ICD-10-CM

## 2023-11-16 DIAGNOSIS — M12.811 ROTATOR CUFF TEAR ARTHROPATHY OF RIGHT SHOULDER: ICD-10-CM

## 2023-11-16 DIAGNOSIS — G89.29 CHRONIC LOW BACK PAIN WITH SCIATICA, SCIATICA LATERALITY UNSPECIFIED, UNSPECIFIED BACK PAIN LATERALITY: ICD-10-CM

## 2023-11-16 DIAGNOSIS — M75.50 BURSITIS AND TENDINITIS OF SHOULDER REGION: ICD-10-CM

## 2023-11-16 DIAGNOSIS — G62.9 NEUROPATHY: ICD-10-CM

## 2023-11-16 DIAGNOSIS — M75.41 IMPINGEMENT SYNDROME OF RIGHT SHOULDER: ICD-10-CM

## 2023-11-16 DIAGNOSIS — M75.20 TENDINITIS OF LONG HEAD OF BICEPS: ICD-10-CM

## 2023-11-16 PROCEDURE — 20552 NJX 1/MLT TRIGGER POINT 1/2: CPT

## 2023-11-16 PROCEDURE — 99211 OFF/OP EST MAY X REQ PHY/QHP: CPT

## 2023-11-16 RX ORDER — BUPIVACAINE HYDROCHLORIDE 2.5 MG/ML
10 INJECTION, SOLUTION EPIDURAL; INFILTRATION; INTRACAUDAL ONCE
Status: ACTIVE | OUTPATIENT
Start: 2023-11-16

## 2023-11-16 RX ORDER — HYDROCODONE BITARTRATE AND ACETAMINOPHEN 10; 325 MG/1; MG/1
1 TABLET ORAL EVERY 6 HOURS PRN
Qty: 120 TABLET | Refills: 0 | Status: CANCELLED | OUTPATIENT
Start: 2023-11-16 | End: 2023-12-16

## 2023-11-16 RX ORDER — METHYLPREDNISOLONE ACETATE 40 MG/ML
40 INJECTION, SUSPENSION INTRA-ARTICULAR; INTRALESIONAL; INTRAMUSCULAR; SOFT TISSUE ONCE
Status: ACTIVE | OUTPATIENT
Start: 2023-11-16

## 2023-11-16 RX ORDER — HYDROCODONE BITARTRATE AND ACETAMINOPHEN 10; 325 MG/1; MG/1
1 TABLET ORAL EVERY 6 HOURS PRN
Qty: 120 TABLET | Refills: 0 | Status: CANCELLED | OUTPATIENT
Start: 2023-12-16 | End: 2024-01-15

## 2023-11-16 RX ORDER — HYDROCODONE BITARTRATE AND ACETAMINOPHEN 7.5; 325 MG/1; MG/1
1 TABLET ORAL EVERY 6 HOURS PRN
Qty: 120 TABLET | Refills: 0 | Status: SHIPPED | OUTPATIENT
Start: 2023-12-16 | End: 2024-01-15

## 2023-11-16 RX ORDER — CELECOXIB 200 MG/1
200 CAPSULE ORAL 2 TIMES DAILY
Qty: 60 CAPSULE | Refills: 2 | Status: SHIPPED | OUTPATIENT
Start: 2023-11-16 | End: 2024-02-14

## 2023-11-16 RX ORDER — HYDROCODONE BITARTRATE AND ACETAMINOPHEN 7.5; 325 MG/1; MG/1
1 TABLET ORAL EVERY 6 HOURS PRN
COMMUNITY
End: 2023-11-16

## 2023-11-16 RX ORDER — HYDROCODONE BITARTRATE AND ACETAMINOPHEN 7.5; 325 MG/1; MG/1
1 TABLET ORAL EVERY 6 HOURS PRN
Qty: 120 TABLET | Refills: 0 | Status: SHIPPED | OUTPATIENT
Start: 2023-11-16 | End: 2023-12-16

## 2023-11-16 NOTE — PROGRESS NOTES
PT presents ambulatory to the [facility].  Pt reports 9/10 pain today . Dr. Dyaana Kim saw PT to discuss another injection and discuss TPI. Refer to dictation for POC.

## 2023-11-16 NOTE — CHRONIC PAIN
Pain center progress note      HISTORY OF PRESENT ILLNESS:  Candelario Mir is a 61year old old male referred to the pain clinic  for evaluation treatment of his multiple orthopedic issues we done Jess Leon for quite some time he has been a patient of ours over the last 10 to15 years he has had multiple issues and review regarding his low back shoulders cervical stenosis lumbar stenosis had a prior lumbar fusion he also has a neuropathic pain involving both bilateral extremities with intermittent swelling toward the afternoon hours he continues to work as a chiropractor and is fairly active he has been taking intermittent doses of Norco for pain control throughout the last 10 to 15 years he has been started on Neurontin he is up to 600 mg 3 times daily and Celebrex. Today the his back left leg and bilateral shoulders are his complaints and he also has neuropathic pain at night is keeping him up with a burning sensation at night he also has bilateral leg swelling after he spends a full day doing his job he is trying to use compression devices but still having issues with the pain. He is complaining specifically of a left radicular pain which has been increasing with his increased hours of work additionally left lower back pain. He also has complaints of bilateral shoulder pain which has been interrupting his sleep pattern as he cannot find a comfortable position. PAIN COURSE AND PREVIOUS INTERVENTIONS:    Interventions: Physical therapy multiple cortisone injections epidurals  Adjuvants:      BLOOD THINNING MEDICATIONS:  None    CURRENT MEDICATIONS:  Current Outpatient Medications   Medication Sig Dispense Refill    HYDROcodone-acetaminophen 7.5-325 MG Oral Tab Take 1 tablet by mouth every 6 (six) hours as needed for Pain (MAX 4/DAY). HYDROcodone-acetaminophen 7.5-325 MG Oral Tab Take 1 tablet by mouth every 6 (six) hours as needed for Pain (MAX 4/DAY).       Naloxone HCl 4 MG/0.1ML Nasal Liquid 4 mg by Nasal route as needed. If patient remains unresponsive, repeat dose in other nostril 2-5 minutes after first dose. 1 kit 0    Phenazopyridine HCl (AZO TABS) 95 MG Oral Tab Take 1 tablet (97.2 mg total) by mouth 3 (three) times daily as needed for Pain. 21 tablet 6    pentosan polysulfate (ELMIRON) 100 MG Oral Cap Take 2 capsules (200 mg total) by mouth in the morning and 2 capsules (200 mg total) before bedtime. 1200 capsule 5    hydrOXYzine 25 MG Oral Tab Take 1-2 tablets (25-50 mg total) by mouth at bedtime. 90 tablet 5    DUTASTERIDE 0.5 MG Oral Cap TAKE 1 CAPSULE(0.5 MG) BY MOUTH DAILY (Patient taking differently: Take 0.5 mg by mouth nightly.) 90 capsule 0    DULoxetine 30 MG Oral Cap DR Particles Take 2 capsules by mouth nightly (Patient taking differently: Take 30 mg by mouth nightly as needed. Take 2 capsules by mouth nightly) 180 capsule 0    ZOLPIDEM 5 MG Oral Tab TAKE 1 TABLET BY MOUTH EVERY DAY AT BEDTIME (Patient taking differently: Take 5 mg by mouth nightly as needed.) 30 tablet 0    ondansetron (ZOFRAN) 8 MG tablet Take 8 mg by mouth every 8 (eight) hours as needed. amLODIPine 5 MG Oral Tab Take 5 mg by mouth every morning.       METOPROLOL TARTRATE 25 MG Oral Tab TAKE 1 TABLET(25 MG) BY MOUTH TWICE DAILY (Patient taking differently: Take 25 mg by mouth every morning.) 180 tablet 3    Pantoprazole Sodium 40 MG Oral Tab EC Take 40 mg by mouth every morning before breakfast.          ALLERGIES:  No Known Allergies    SURGICAL HISTORY:  Past Surgical History:   Procedure Laterality Date    BACK SURGERY  2021    laminectomy; microdiskectomy    CATARACT SURGERY, COMPLEX Bilateral     LITHOTRIPSY      Kidney stones removed    SHOULDER ARTHROSCOPY         REVIEW OF SYSTEMS:   A comprehensive review of systems was negative except for:     MEDICAL HISTORY:  Patient Active Problem List   Diagnosis    Chronic pain of right knee    Internal derangement of knee joint    Pain of right hip joint    Acute pain of left knee    Internal derangement of left knee    Rotator cuff tear arthropathy of right shoulder    Impingement syndrome of right shoulder    Hamstring injury    Essential hypertension    Impingement syndrome of left shoulder    Acute pain of left shoulder    Labral tear of long head of biceps tendon, right, initial encounter    Calf swelling    Right calf pain    Left foot pain    Arthritis of left shoulder region    Partial nontraumatic tear of rotator cuff, left    Tendinitis of long head of biceps    Chronic pain in right shoulder    Localized edema    Chronic night sweats    Stage 3a chronic kidney disease (HCC)    Chronic low back pain without sciatica    B12 deficiency     Past Medical History:   Diagnosis Date    Calculus of kidney     Cancer (HCC)     Prosatate CA    Cataract     Bilateral    Essential hypertension     GERD (gastroesophageal reflux disease)     High blood pressure     Hx of motion sickness     PONV (postoperative nausea and vomiting)     Ruptured hamstring tendon 2015    Spastic bladder        FAMILY HISTORY:  Family History   Problem Relation Age of Onset    Heart Disorder Father     No Known Problems Mother        SOCIAL HISTORY:  Social History     Socioeconomic History    Marital status:      Spouse name: Not on file    Number of children: Not on file    Years of education: Not on file    Highest education level: Not on file   Occupational History    Not on file   Tobacco Use    Smoking status: Never    Smokeless tobacco: Never   Vaping Use    Vaping Use: Never used   Substance and Sexual Activity    Alcohol use: Never     Alcohol/week: 1.0 standard drink of alcohol     Types: 1 Glasses of wine per week     Comment: 1 glass wine 1 to 2 times for month    Drug use: Never    Sexual activity: Not on file   Other Topics Concern    Left Handed No    Right Handed Yes    Currently spends a great deal of time in the sun No    Past Sunlamp Treatments for Acne Not Asked    History of tanning No    Hx of Spending [address] of Time in [place] No    Bad sunburns in the past No    Tanning Salons in the Past No    Hx of Radiation Treatments No    Regular use of sun block Not Asked     Service Not Asked    Blood Transfusions Not Asked    Caffeine Concern No    Occupational Exposure Not Asked    [address] Hazards Not Asked    Sleep Concern Not Asked    Stress Concern Not Asked    Weight Concern Not Asked    Special Diet Not Asked    Back Care Not Asked    Exercise Yes    Bike Helmet Not Asked    Seat Belt Not Asked    Self-Exams Not Asked   Social History Narrative    Not on file     Social Determinants of Health     Financial Resource Strain: Not on file   Food Insecurity: Not on file   Transportation Needs: Not on file   Physical Activity: Not on file   Stress: Not on file   Social Connections: Not on file   Housing Stability: Not on file       ADVANCE CARE PLANNING:      PHYSICAL EXAMINATION:  Vitals:    11/16/23 0736   BP: 126/84   BP Location: Left arm   Patient Position: Sitting   Cuff Size: large   Pulse: 65   SpO2: 97%     General: Alert and oriented x3  Affect:  NAD  Head: normocephalic, atraumatic  Eyes: anicteric; no injection  Chest: S1, S2, RRR  Respiratory: CTAB  Gait: Normal; cane user - No  Spine: Normal  Upper EXTR: Bilateral noted tenderness over the long head of both biceps and bursal area.   ROM:   Lumbar spine: Left L5/S1 multifield is tenderness  Flexion  full  Extension ful  Cervical Spine  Flexion  full  Extensionfull  MOTOR EXAMINATION:  UPPER EXTREMITY      LEFT RIGHT   Deltoid 5/5 5/5   Biceps 5/5 5/5   Triceps 5/5 5/5   Brachioradialis 5/5 5/5   Wrist Flexors 5/5 5/5   Wrist Extensors 5/5 5/5   Intrinsic Hand 5/5 5/5    5/5 5/5     LOWER EXTREMITY      LEFT RIGHT   Iliopsoas 5/5 5/5   Quadriceps 5/5 5/5   Foot DF 5/5 5/5   Foot EHL 5/5 5/5   Gastrocnemius 5/5 5/5     PULSES      LEFT RIGHT   Radial 2/4 2/4   Dorsalis Pedis 2/4 2/4   Posterior Tibial 2/4 2/4   Brachial 2/4 2/4 SLR: negative right slightly positive on the left at 80 degrees  SIJ tenderness negative  Joint Exam: Crepitus  TPs:  Present within lumbo-sacral paraspinal muscles    Right Deep tendon reflexes: Normal   Left Deep tendon reflexes: Normal   Babinski Reflex: absent bilaterally   Temperature:  normal to touch bilateral upper and lower extremities  Edema - Present  *    Sensation (light touch/pinprick/temperature):     Right Lower Extremity:  Normal  Left Lower Extremity: Normal  Right Upper Extremity:  Normal  Left Upper Extremity:  Normal    IMAGING:  No results found.     LABS:  Lab Results   Component Value Date    WBC 10.2 09/06/2023    RBC 5.30 09/06/2023    HGB 14.3 09/06/2023    HCT 44.1 09/06/2023    MCV 83.2 09/06/2023    MCH 27.0 09/06/2023    MCHC 32.4 09/06/2023    RDW 15.4 (H) 09/06/2023    .0 09/06/2023    MPV 8.7 12/30/2017     Lab Results   Component Value Date     09/06/2023    K 3.8 09/06/2023     09/06/2023    CO2 23.0 09/06/2023    BUN 14 09/06/2023    GLU 93 09/06/2023    CA 9.1 09/06/2023     Lab Results   Component Value Date    INR 1.01 04/08/2021       [place] [facility] MONITORING PROGRAM REVIEWED  No      ASSESSMENT:   61year old old male with chronic pain with multiple orthopedic issues including a prior lumbar fusion cervical stenosis both shoulder repairs  Bilateral lower extremity neuropathies     on and off prescription opioid use over the past 10 years     PLAN:  RECOMMENDATIONS:  Continue with intermittent use of Norco 1-3 a day max 4-day for pain and Celebrex  He is on Neurontin which is not helping his leg pain at night so we will switch him over to Lyrica 75 mg twice daily  Recommend today left multi for this L5-S1 trigger point injection as well as bilateral biceps bursa injections  I have informed this Melanie Gay. relative of the risks of injection including, but not limited to: failure, diaphragm paralysis, pneumothorax, toxicity, seizure, cardiac arrest, infection, bleeding, nerve damage. The patient desires the proposed injection as planned. If patient's left radicular leg symptoms continue then considerations for a lumbar epidural steroid injection will be entertained as this is helped him in the past.  I have informed Ha Pitt  of the risks of neuraxial injection including, but not limited to: failure, headache, backache, spinal, unilateral/patchy block, difficulty breathing, infection, bleeding, nerve damage, paralysis, death. The patient desires the proposed neuraxial injection as planned. Compression stockings for his legs recommended  Comprehensive analgesic plan was formulated. Conservative vs. Aggressive measures were discussed at length including pharmacotherapy (eg. Anti- inflammatories, muscle relaxants, neuropathic medications, oral steroids, analgesics), injections, and further testing. Risks and benefits of all options were discussed at length to patients satisfaction during a comprehensive interactive discussion. All questions were answered during extended questions and answer session. Patient agreeable to discussion plan. Greater than 50% of the time was spent with counseling (nature of discussion centered around pain, therapy, and treatment options), face to face time, time spent reviewing data, obtaining patient information and discussing the care with the patients health care providers.      Total time 30 minutes    [facility]  Pain Procedure Note    Procedure Type:  Trigger Point Injection/bursa injections  Prep:  Hand Washing, Time Out, Sterile Gloves  Site Prep:  Alcohol   Position:  Sitting  Level: Left multifidus S1 and bilateral long tendon biceps bursa injections  Local Anesthetic:  1/4% Marcaine 2 ml each site  Needle:  25 ga 1.5 inch  Technique:  Injection Through Needle  Attempts:  First Attempt  Injectate:  Depomedrol 20mg site  Injectate Volume:  7.5mL total volume  Narrative:  No Paresthesia  Complications:  No Complications Band-Aids applied  Success:  Good Pain Relief, Patient Tolerated Well      Colleen Atkinson MD    11/16/2023

## 2023-11-17 ENCOUNTER — TELEPHONE (OUTPATIENT)
Dept: PAIN CLINIC | Facility: HOSPITAL | Age: 63
End: 2023-11-17

## 2023-11-17 DIAGNOSIS — M96.1 POST LAMINECTOMY SYNDROME: Primary | ICD-10-CM

## 2023-11-17 DIAGNOSIS — M54.16 LUMBAR RADICULOPATHY: ICD-10-CM

## 2023-11-17 NOTE — TELEPHONE ENCOUNTER
Lumbar SUSANNE L4/5 - Cpt 54084 - Dx M96.1, M54.16 - PRIOR AUTH NOT [address] online, request above is authorized.    Decision ID # [id number] effective date: 11/17/23 - 12/31/23

## 2023-12-29 ENCOUNTER — TELEPHONE (OUTPATIENT)
Dept: PAIN CLINIC | Facility: HOSPITAL | Age: 63
End: 2023-12-29

## 2023-12-29 RX ORDER — HYDROCODONE BITARTRATE AND ACETAMINOPHEN 10; 325 MG/1; MG/1
1 TABLET ORAL EVERY 6 HOURS PRN
Qty: 120 TABLET | Refills: 0 | Status: SHIPPED | OUTPATIENT
Start: 2023-12-29 | End: 2024-01-28

## 2024-01-05 ENCOUNTER — TELEPHONE (OUTPATIENT)
Dept: PAIN CLINIC | Facility: CLINIC | Age: 64
End: 2024-01-05

## 2024-01-05 ENCOUNTER — AFTERCARE VISIT (OUTPATIENT)
Dept: PAIN CLINIC | Facility: HOSPITAL | Age: 64
End: 2024-01-05

## 2024-01-05 DIAGNOSIS — M54.16 LUMBAR RADICULOPATHY: Primary | ICD-10-CM

## 2024-01-05 DIAGNOSIS — M96.1 POST LAMINECTOMY SYNDROME: ICD-10-CM

## 2024-01-05 RX ORDER — HYDROCODONE BITARTRATE AND ACETAMINOPHEN 10; 325 MG/1; MG/1
1 TABLET ORAL EVERY 6 HOURS PRN
Qty: 120 TABLET | Refills: 0 | Status: SHIPPED | OUTPATIENT
Start: 2024-01-05 | End: 2024-02-04

## 2024-01-05 NOTE — TELEPHONE ENCOUNTER
Patient has no active insurance coverage with [facility] nor with [facility].     New insurance information will be required for LESI prior authorization.

## 2024-01-10 DIAGNOSIS — M96.1 POST LAMINECTOMY SYNDROME: ICD-10-CM

## 2024-01-10 DIAGNOSIS — M54.40 CHRONIC LOW BACK PAIN WITH SCIATICA, SCIATICA LATERALITY UNSPECIFIED, UNSPECIFIED BACK PAIN LATERALITY: Primary | ICD-10-CM

## 2024-01-10 DIAGNOSIS — G89.29 CHRONIC LOW BACK PAIN WITH SCIATICA, SCIATICA LATERALITY UNSPECIFIED, UNSPECIFIED BACK PAIN LATERALITY: Primary | ICD-10-CM

## 2024-01-10 RX ORDER — HYDROCODONE BITARTRATE AND ACETAMINOPHEN 10; 325 MG/1; MG/1
1 TABLET ORAL EVERY 6 HOURS PRN
Qty: 120 TABLET | Refills: 0 | Status: SHIPPED | OUTPATIENT
Start: 2024-01-10 | End: 2024-02-09

## 2024-01-10 RX ORDER — HYDROCODONE BITARTRATE AND ACETAMINOPHEN 10; 325 MG/1; MG/1
1 TABLET ORAL EVERY 6 HOURS PRN
Qty: 120 TABLET | Refills: 0 | Status: SHIPPED | OUTPATIENT
Start: 2024-01-10 | End: 2024-01-10 | Stop reason: RX

## 2024-01-25 NOTE — TELEPHONE ENCOUNTER
Prior authorization request completed for: TELMA   Authorization #No Prior Authorization is Required.  -based on medical necessity.    Authorization dates: n/a   CPT codes approved: 96226  Number of visits/dates of service approved: 1  Physician: Murphy   Location: [facility]    Call Ref#: [id number]  Representative Name: Sweetie HINKLE  Insurance Carrier: [employer] (phone #: [phone])   Total Call Time: 11:13     Patient can be scheduled.

## 2024-01-26 DIAGNOSIS — M54.16 LUMBAR RADICULOPATHY: Primary | ICD-10-CM

## 2024-02-29 DIAGNOSIS — M96.1 POST LAMINECTOMY SYNDROME: Primary | ICD-10-CM

## 2024-02-29 DIAGNOSIS — M96.1 FAILED BACK SYNDROME OF LUMBAR SPINE: ICD-10-CM

## 2024-02-29 RX ORDER — HYDROCODONE BITARTRATE AND ACETAMINOPHEN 10; 325 MG/1; MG/1
1 TABLET ORAL EVERY 6 HOURS PRN
Qty: 120 TABLET | Refills: 0 | Status: SHIPPED | OUTPATIENT
Start: 2024-02-29 | End: 2024-03-30

## 2024-04-03 ENCOUNTER — TELEPHONE (OUTPATIENT)
Dept: PAIN CLINIC | Facility: HOSPITAL | Age: 64
End: 2024-04-03

## 2024-04-03 DIAGNOSIS — M96.1 FAILED BACK SYNDROME OF LUMBAR SPINE: Primary | ICD-10-CM

## 2024-04-03 RX ORDER — HYDROCODONE BITARTRATE AND ACETAMINOPHEN 10; 325 MG/1; MG/1
1 TABLET ORAL EVERY 4 HOURS PRN
Qty: 150 TABLET | Refills: 0 | Status: SHIPPED | OUTPATIENT
Start: 2024-04-03 | End: 2024-05-03

## 2024-04-03 NOTE — TELEPHONE ENCOUNTER
Pt with low back pain last epidural helped a lot 3 months   Wants to repeat as he is having increase lumbar pain   Also needs norco script

## 2024-04-04 ENCOUNTER — TELEPHONE (OUTPATIENT)
Dept: PAIN CLINIC | Facility: HOSPITAL | Age: 64
End: 2024-04-04

## 2024-04-04 NOTE — TELEPHONE ENCOUNTER
Prior authorization request completed for: TELMA    Authorization #NO PRIOR AUTHORIZATION IS REQUIRED  Pre-D: Not required  Exclusions/Restrictions: Based on medical necessity  Covered Benefit: Based on medical necessity  Authorization dates: N/A  CPT codes approved: 48430  Number of visits/dates of service approved: 1  Physician: Blake   Location: [facility]    Call Ref#: [id number]  Representative Name: Gama   Insurance Carrier: [employer] [facility] [phone]    Patient can be scheduled.

## 2024-06-05 DIAGNOSIS — M96.1 POST LAMINECTOMY SYNDROME: Primary | ICD-10-CM

## 2024-06-05 RX ORDER — HYDROCODONE BITARTRATE AND ACETAMINOPHEN 10; 325 MG/1; MG/1
1 TABLET ORAL EVERY 6 HOURS PRN
Qty: 120 TABLET | Refills: 0 | Status: SHIPPED | OUTPATIENT
Start: 2024-06-05 | End: 2024-07-05

## 2024-06-05 NOTE — TELEPHONE ENCOUNTER
Alfredito pain is being controlled with 3-4 norco a day   Not taking that amount every day   No side effects   Will renew script today   Plan on office visit soon

## 2024-07-12 ENCOUNTER — TELEPHONE (OUTPATIENT)
Dept: PAIN CLINIC | Facility: HOSPITAL | Age: 64
End: 2024-07-12

## 2024-07-12 DIAGNOSIS — M54.40 CHRONIC LOW BACK PAIN WITH SCIATICA, SCIATICA LATERALITY UNSPECIFIED, UNSPECIFIED BACK PAIN LATERALITY: ICD-10-CM

## 2024-07-12 DIAGNOSIS — M96.1 FAILED BACK SYNDROME OF LUMBAR SPINE: ICD-10-CM

## 2024-07-12 DIAGNOSIS — G89.29 CHRONIC LOW BACK PAIN WITH SCIATICA, SCIATICA LATERALITY UNSPECIFIED, UNSPECIFIED BACK PAIN LATERALITY: ICD-10-CM

## 2024-07-12 DIAGNOSIS — M96.1 POST LAMINECTOMY SYNDROME: Primary | ICD-10-CM

## 2024-07-12 DIAGNOSIS — M54.16 LUMBAR RADICULOPATHY: ICD-10-CM

## 2024-07-12 RX ORDER — CELECOXIB 200 MG/1
200 CAPSULE ORAL NIGHTLY PRN
Qty: 30 CAPSULE | Refills: 0 | Status: SHIPPED | OUTPATIENT
Start: 2024-07-12 | End: 2024-08-11

## 2024-07-12 RX ORDER — HYDROCODONE BITARTRATE AND ACETAMINOPHEN 10; 325 MG/1; MG/1
1 TABLET ORAL EVERY 6 HOURS PRN
Qty: 120 TABLET | Refills: 0 | Status: SHIPPED | OUTPATIENT
Start: 2024-07-12 | End: 2024-08-11

## 2024-07-12 NOTE — TELEPHONE ENCOUNTER
Alfredito pain is being controlled with 3-4 norco a day with celebrex  Not need same amount every day  No side effects   Will renew scripts today   Plan OV w Dr Arrington next month

## 2024-08-15 ENCOUNTER — LAB ENCOUNTER (OUTPATIENT)
Dept: LAB | Facility: HOSPITAL | Age: 64
End: 2024-08-15
Attending: UROLOGY
Payer: COMMERCIAL

## 2024-08-15 DIAGNOSIS — Z00.00 ROUTINE GENERAL MEDICAL EXAMINATION AT A HEALTH CARE FACILITY: Primary | ICD-10-CM

## 2024-08-15 LAB
ALBUMIN SERPL-MCNC: 4.6 G/DL (ref 3.2–4.8)
ALBUMIN/GLOB SERPL: 1.8 {RATIO} (ref 1–2)
ALP LIVER SERPL-CCNC: 85 U/L
ALT SERPL-CCNC: 27 U/L
ANION GAP SERPL CALC-SCNC: 8 MMOL/L (ref 0–18)
AST SERPL-CCNC: 31 U/L (ref ?–34)
BASOPHILS # BLD AUTO: 0.03 X10(3) UL (ref 0–0.2)
BASOPHILS NFR BLD AUTO: 0.4 %
BILIRUB SERPL-MCNC: 0.8 MG/DL (ref 0.2–1.1)
BILIRUB UR QL: NEGATIVE
BUN BLD-MCNC: 10 MG/DL (ref 9–23)
BUN/CREAT SERPL: 8.2 (ref 10–20)
CALCIUM BLD-MCNC: 9.4 MG/DL (ref 8.7–10.4)
CHLORIDE SERPL-SCNC: 110 MMOL/L (ref 98–112)
CHOLEST SERPL-MCNC: 204 MG/DL (ref ?–200)
CLARITY UR: CLEAR
CO2 SERPL-SCNC: 25 MMOL/L (ref 21–32)
COLOR UR: YELLOW
COMPLEXED PSA SERPL-MCNC: 0.56 NG/ML (ref ?–4)
CREAT BLD-MCNC: 1.22 MG/DL
CREAT UR-SCNC: 139 MG/DL
DEPRECATED RDW RBC AUTO: 44 FL (ref 35.1–46.3)
EGFRCR SERPLBLD CKD-EPI 2021: 66 ML/MIN/1.73M2 (ref 60–?)
EOSINOPHIL # BLD AUTO: 0.17 X10(3) UL (ref 0–0.7)
EOSINOPHIL NFR BLD AUTO: 2.3 %
ERYTHROCYTE [DISTWIDTH] IN BLOOD BY AUTOMATED COUNT: 14.7 % (ref 11–15)
EST. AVERAGE GLUCOSE BLD GHB EST-MCNC: 103 MG/DL (ref 68–126)
FASTING PATIENT LIPID ANSWER: YES
FASTING STATUS PATIENT QL REPORTED: YES
GLOBULIN PLAS-MCNC: 2.6 G/DL (ref 2–3.5)
GLUCOSE BLD-MCNC: 76 MG/DL (ref 70–99)
GLUCOSE UR-MCNC: NORMAL MG/DL
HBA1C MFR BLD: 5.2 % (ref ?–5.7)
HCT VFR BLD AUTO: 47.2 %
HDLC SERPL-MCNC: 34 MG/DL (ref 40–59)
HGB BLD-MCNC: 16 G/DL
HGB UR QL STRIP.AUTO: NEGATIVE
IMM GRANULOCYTES # BLD AUTO: 0.03 X10(3) UL (ref 0–1)
IMM GRANULOCYTES NFR BLD: 0.4 %
KETONES UR-MCNC: 10 MG/DL
LDLC SERPL CALC-MCNC: 127 MG/DL (ref ?–100)
LEUKOCYTE ESTERASE UR QL STRIP.AUTO: NEGATIVE
LYMPHOCYTES # BLD AUTO: 1.37 X10(3) UL (ref 1–4)
LYMPHOCYTES NFR BLD AUTO: 18.8 %
MCH RBC QN AUTO: 28.3 PG (ref 26–34)
MCHC RBC AUTO-ENTMCNC: 33.9 G/DL (ref 31–37)
MCV RBC AUTO: 83.4 FL
MICROALBUMIN UR-MCNC: <0.3 MG/DL
MONOCYTES # BLD AUTO: 0.48 X10(3) UL (ref 0.1–1)
MONOCYTES NFR BLD AUTO: 6.6 %
NEUTROPHILS # BLD AUTO: 5.21 X10 (3) UL (ref 1.5–7.7)
NEUTROPHILS # BLD AUTO: 5.21 X10(3) UL (ref 1.5–7.7)
NEUTROPHILS NFR BLD AUTO: 71.5 %
NITRITE UR QL STRIP.AUTO: NEGATIVE
NONHDLC SERPL-MCNC: 170 MG/DL (ref ?–130)
OSMOLALITY SERPL CALC.SUM OF ELEC: 294 MOSM/KG (ref 275–295)
PH UR: 7.5 [PH] (ref 5–8)
PLATELET # BLD AUTO: 194 10(3)UL (ref 150–450)
POTASSIUM SERPL-SCNC: 4.6 MMOL/L (ref 3.5–5.1)
PROT SERPL-MCNC: 7.2 G/DL (ref 5.7–8.2)
PROT UR-MCNC: NEGATIVE MG/DL
RBC # BLD AUTO: 5.66 X10(6)UL
SODIUM SERPL-SCNC: 143 MMOL/L (ref 136–145)
SP GR UR STRIP: 1.02 (ref 1–1.03)
TRIGL SERPL-MCNC: 239 MG/DL (ref 30–149)
TSI SER-ACNC: 1.74 MIU/ML (ref 0.55–4.78)
UROBILINOGEN UR STRIP-ACNC: NORMAL
VLDLC SERPL CALC-MCNC: 43 MG/DL (ref 0–30)
WBC # BLD AUTO: 7.3 X10(3) UL (ref 4–11)

## 2024-08-15 PROCEDURE — 85025 COMPLETE CBC W/AUTO DIFF WBC: CPT

## 2024-08-15 PROCEDURE — 81003 URINALYSIS AUTO W/O SCOPE: CPT

## 2024-08-15 PROCEDURE — 83036 HEMOGLOBIN GLYCOSYLATED A1C: CPT

## 2024-08-15 PROCEDURE — 80053 COMPREHEN METABOLIC PANEL: CPT

## 2024-08-15 PROCEDURE — 80061 LIPID PANEL: CPT

## 2024-08-15 PROCEDURE — 82043 UR ALBUMIN QUANTITATIVE: CPT

## 2024-08-15 PROCEDURE — 82570 ASSAY OF URINE CREATININE: CPT

## 2024-08-15 PROCEDURE — 84443 ASSAY THYROID STIM HORMONE: CPT

## 2024-08-15 PROCEDURE — 36415 COLL VENOUS BLD VENIPUNCTURE: CPT

## 2024-08-19 ENCOUNTER — TELEPHONE (OUTPATIENT)
Dept: PAIN CLINIC | Facility: HOSPITAL | Age: 64
End: 2024-08-19

## 2024-08-19 DIAGNOSIS — M54.16 LUMBAR RADICULOPATHY: Primary | ICD-10-CM

## 2024-08-19 DIAGNOSIS — M96.1 POST LAMINECTOMY SYNDROME: ICD-10-CM

## 2024-08-19 RX ORDER — HYDROCODONE BITARTRATE AND ACETAMINOPHEN 10; 325 MG/1; MG/1
1 TABLET ORAL EVERY 6 HOURS PRN
Qty: 120 TABLET | Refills: 0 | Status: SHIPPED | OUTPATIENT
Start: 2024-08-19 | End: 2024-09-18

## 2024-08-19 NOTE — TELEPHONE ENCOUNTER
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

## 2024-09-24 ENCOUNTER — TELEPHONE (OUTPATIENT)
Dept: PAIN CLINIC | Facility: HOSPITAL | Age: 64
End: 2024-09-24

## 2024-09-24 DIAGNOSIS — M96.1 FAILED BACK SYNDROME OF LUMBAR SPINE: Primary | ICD-10-CM

## 2024-09-24 RX ORDER — HYDROCODONE BITARTRATE AND ACETAMINOPHEN 10; 325 MG/1; MG/1
1 TABLET ORAL EVERY 6 HOURS PRN
Qty: 120 TABLET | Refills: 0 | Status: SHIPPED | OUTPATIENT
Start: 2024-09-24 | End: 2024-10-24

## 2024-10-03 ENCOUNTER — OFFICE VISIT (OUTPATIENT)
Dept: PAIN CLINIC | Facility: HOSPITAL | Age: 64
End: 2024-10-03
Attending: ANESTHESIOLOGY
Payer: COMMERCIAL

## 2024-10-03 VITALS — SYSTOLIC BLOOD PRESSURE: 138 MMHG | HEART RATE: 87 BPM | DIASTOLIC BLOOD PRESSURE: 81 MMHG | OXYGEN SATURATION: 96 %

## 2024-10-03 DIAGNOSIS — Z79.891 ENCOUNTER FOR MONITORING OPIOID MAINTENANCE THERAPY: ICD-10-CM

## 2024-10-03 DIAGNOSIS — M48.02 CERVICAL SPINAL STENOSIS: Primary | ICD-10-CM

## 2024-10-03 DIAGNOSIS — M96.1 POST LAMINECTOMY SYNDROME: ICD-10-CM

## 2024-10-03 DIAGNOSIS — M54.40 CHRONIC LOW BACK PAIN WITH SCIATICA, SCIATICA LATERALITY UNSPECIFIED, UNSPECIFIED BACK PAIN LATERALITY: ICD-10-CM

## 2024-10-03 DIAGNOSIS — Z51.81 ENCOUNTER FOR MONITORING OPIOID MAINTENANCE THERAPY: ICD-10-CM

## 2024-10-03 DIAGNOSIS — M96.1 FAILED BACK SYNDROME OF LUMBAR SPINE: ICD-10-CM

## 2024-10-03 DIAGNOSIS — G89.29 CHRONIC LOW BACK PAIN WITH SCIATICA, SCIATICA LATERALITY UNSPECIFIED, UNSPECIFIED BACK PAIN LATERALITY: ICD-10-CM

## 2024-10-03 DIAGNOSIS — M54.16 LUMBAR RADICULOPATHY: ICD-10-CM

## 2024-10-03 LAB
AMPHET UR QL SCN: NEGATIVE
CANNABINOIDS UR QL SCN: NEGATIVE
COCAINE UR QL: NEGATIVE
CREAT UR-SCNC: 109.2 MG/DL
FENTANYL UR QL SCN: NEGATIVE
MDMA UR QL SCN: NEGATIVE
METHADONE UR QL SCN: NEGATIVE
OXYCODONE UR QL SCN: NEGATIVE
PCP UR QL SCN: NEGATIVE

## 2024-10-03 PROCEDURE — 80345 DRUG SCREENING BARBITURATES: CPT | Performed by: ANESTHESIOLOGY

## 2024-10-03 PROCEDURE — 80346 BENZODIAZEPINES1-12: CPT | Performed by: ANESTHESIOLOGY

## 2024-10-03 PROCEDURE — 80361 OPIATES 1 OR MORE: CPT | Performed by: ANESTHESIOLOGY

## 2024-10-03 PROCEDURE — 99211 OFF/OP EST MAY X REQ PHY/QHP: CPT

## 2024-10-03 PROCEDURE — 80307 DRUG TEST PRSMV CHEM ANLYZR: CPT | Performed by: ANESTHESIOLOGY

## 2024-10-03 RX ORDER — HYDROCODONE BITARTRATE AND ACETAMINOPHEN 10; 325 MG/1; MG/1
1 TABLET ORAL EVERY 6 HOURS PRN
Qty: 120 TABLET | Refills: 0 | Status: SHIPPED | OUTPATIENT
Start: 2024-11-24 | End: 2024-12-24

## 2024-10-03 RX ORDER — HYDROCODONE BITARTRATE AND ACETAMINOPHEN 10; 325 MG/1; MG/1
1 TABLET ORAL EVERY 6 HOURS PRN
Qty: 120 TABLET | Refills: 0 | Status: SHIPPED | OUTPATIENT
Start: 2024-12-24 | End: 2025-01-23

## 2024-10-03 NOTE — PATIENT INSTRUCTIONS
Refill policies:    Allow 2-3 business days for refills; controlled substances may take longer.  Contact your pharmacy at least 5 days prior to running out of medication and have them send an electronic request or submit request through the “request refill” option in your [email or web address] account.  Refills are not addressed on weekends; covering physicians do not authorize routine medications on weekends.  No narcotics or controlled substances are refilled after noon on Fridays or by on call physicians.  By law, narcotics must be electronically prescribed.  A 30 day supply with no refills is the maximum allowed.  If your prescription is due for a refill, you may be due for a follow up appointment.  To best provide you care, patients receiving routine medications need to be seen at least once a year.  Patients receiving narcotic/controlled substance medications need to be seen at least once every 3 months.  In the event that your preferred pharmacy does not have the requested medication in stock (e.g. Backordered), it is your responsibility to find another pharmacy that has the requested medication available.  We will gladly send a new prescription to that pharmacy at your request.    Scheduling Tests:    If your physician has ordered radiology tests such as MRI or CT scans, please contact Central Scheduling at [phone] right away to schedule the test.  Once scheduled, the Counts include [facility] beds at the [facility] Centralized Referral Team will work with your insurance carrier to obtain pre-certification or prior authorization.  Depending on your insurance carrier, approval may take 3-10 days.  It is highly recommended patients assure they have received an authorization before having a test performed.  If test is done without insurance authorization, patient may be responsible for the entire amount billed.      Precertification and Prior Authorizations:  If your physician has recommended that you have a procedure or additional testing performed the Counts include [facility] beds at the [facility]  Centralized Referral Team will contact your insurance carrier to obtain pre-certification or prior authorization.    You are strongly encouraged to contact your insurance carrier to verify that your procedure/test has been approved and is a COVERED benefit.  Although the [facility] Centralized Referral Team does its due diligence, the insurance carrier gives the disclaimer that \"Although the procedure is authorized, this does not guarantee payment.\"    Ultimately the patient is responsible for payment.   Thank you for your understanding in this matter.  Paperwork Completion:  If you require FMLA or disability paperwork for your recovery, please make sure to either drop it off or have it faxed to our office at [phone]. Be sure the form has your name and date of birth on it.  The form will be faxed to our Forms Department and they will complete it for you.  There is a 25$ fee for all forms that need to be filled out.  Please be aware there is a 10-14 day turnaround time.  You will need to sign a release of information (JEANNA) form if your paperwork does not come with one.  You may call the Forms Department with any questions at [phone].  Their fax number is [phone].

## 2024-10-03 NOTE — PROGRESS NOTES
Patient presents in office today with reported pain in Lower back    Current pain level reported = 3-4/10    Last reported dose of Norco      Narcotic Contract renewal 10.03.24    Urine Drug screen 10.03.24

## 2024-10-03 NOTE — CHRONIC PAIN
Initial Consultation Note      HISTORY OF PRESENT ILLNESS:  Alfredito Prather Jr. is a 64 year old old male referred to the pain clinic  for evaluation treatment of his chronic pain Alfredito is a very nice 64-year-old white male who we known at the pain clinic for many years he has had a prior lumbar surgery by Dr. 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 120 tablet 0    [START ON 12/24/2024] HYDROcodone-acetaminophen  MG Oral Tab Take 1 tablet by mouth every 6 (six) hours as needed for Pain. 120 tablet 0    HYDROcodone-acetaminophen  MG Oral Tab Take 1 tablet by mouth every 6 (six) hours as needed for Pain. 120 tablet 0    tamsulosin 0.4 MG Oral Cap Take 0.4 mg by mouth in the morning and 0.4 mg before bedtime.      Naloxone HCl 4 MG/0.1ML Nasal Liquid 4 mg by Nasal route as needed. If patient remains unresponsive, repeat dose in other nostril 2-5  minutes after first dose. (Patient not taking: Reported on 1/30/2024) 1 kit 0    Phenazopyridine HCl (AZO TABS) 95 MG Oral Tab Take 1 tablet (97.2 mg total) by mouth 3 (three) times daily as needed for Pain. 21 tablet 6    pentosan polysulfate (ELMIRON) 100 MG Oral Cap Take 2 capsules (200 mg total) by mouth in the morning and 2 capsules (200 mg total) before bedtime. 1200 capsule 5    hydrOXYzine 25 MG Oral Tab Take 1-2 tablets (25-50 mg total) by mouth at bedtime. 90 tablet 5    DUTASTERIDE 0.5 MG Oral Cap TAKE 1 CAPSULE(0.5 MG) BY MOUTH DAILY (Patient taking differently: Take 0.5 mg by mouth nightly.) 90 capsule 0    DULoxetine 30 MG Oral Cap DR Particles Take 2 capsules by mouth nightly (Patient taking differently: Take 30 mg by mouth nightly as needed. Take 2 capsules by mouth nightly) 180 capsule 0    ZOLPIDEM 5 MG Oral Tab TAKE 1 TABLET BY MOUTH EVERY DAY AT BEDTIME (Patient taking differently: Take 5 mg by mouth nightly as needed.) 30 tablet 0    ondansetron (ZOFRAN) 8 MG tablet Take 8 mg by mouth every 8 (eight) hours as needed.      amLODIPine 5 MG Oral Tab Take 5 mg by mouth every morning.      METOPROLOL TARTRATE 25 MG Oral Tab TAKE 1 TABLET(25 MG) BY MOUTH TWICE DAILY (Patient taking differently: Take 25 mg by mouth every morning.) 180 tablet 3    Pantoprazole Sodium 40 MG Oral Tab EC Take 40 mg by mouth every morning before breakfast.          ALLERGIES:  No Known Allergies    SURGICAL HISTORY:  Past Surgical History:   Procedure Laterality Date    Back surgery  2021    laminectomy; microdiskectomy    Cataract surgery, complex Bilateral     Lithotripsy      Kidney stones removed    Shoulder arthroscopy         REVIEW OF SYSTEMS:   A comprehensive review of systems was negative except for:     MEDICAL HISTORY:  Patient Active Problem List   Diagnosis    Chronic pain of right knee    Internal derangement of knee joint    Pain of right hip joint    Acute pain of left knee    Internal derangement of left  knee    Rotator cuff tear arthropathy of right shoulder    Impingement syndrome of right shoulder    Hamstring injury    Essential hypertension    Impingement syndrome of left shoulder    Acute pain of left shoulder    Labral tear of long head of biceps tendon, right, initial encounter    Calf swelling    Right calf pain    Left foot pain    Arthritis of left shoulder region    Partial nontraumatic tear of rotator cuff, left    Tendinitis of long head of biceps    Chronic pain in right shoulder    Localized edema    Chronic night sweats    Stage 3a chronic kidney disease (HCC)    Chronic low back pain without sciatica    B12 deficiency     Past Medical History:    Calculus of kidney    Cancer (HCC)    Prosatate CA    Cataract    Bilateral    Essential hypertension    GERD (gastroesophageal reflux disease)    High blood pressure    Hx of motion sickness    PONV (postoperative nausea and vomiting)    Ruptured hamstring tendon    Spastic bladder       FAMILY HISTORY:  Family History   Problem Relation Age of Onset    Heart Disorder Father     No Known Problems Mother        SOCIAL HISTORY:  Social History     Socioeconomic History    Marital status:      Spouse name: Not on file    Number of children: Not on file    Years of education: Not on file    Highest education level: Not on file   Occupational History    Not on file   Tobacco Use    Smoking status: Never    Smokeless tobacco: Never   Vaping Use    Vaping status: Never Used   Substance and Sexual Activity    Alcohol use: Never     Alcohol/week: 1.0 standard drink of alcohol     Types: 1 Glasses of wine per week     Comment: 1 glass wine 1 to 2 times for month    Drug use: Never    Sexual activity: Not on file   Other Topics Concern    Left Handed No    Right Handed Yes    Currently spends a great deal of time in the sun No    Past Sunlamp Treatments for Acne Not Asked    History of tanning No    Hx of Spending Great Deal of Time in Sun No    Bad sunburns  in the past No    Tanning Salons in the Past No    Hx of Radiation Treatments No    Regular use of sun block Not Asked     Service Not Asked    Blood Transfusions Not Asked    Caffeine Concern Yes    Occupational Exposure Not Asked    Hobby Hazards Not Asked    Sleep Concern Not Asked    Stress Concern Not Asked    Weight Concern Not Asked    Special Diet Not Asked    Back Care Not Asked    Exercise Yes    Bike Helmet Not Asked    Seat Belt Not Asked    Self-Exams Not Asked   Social History Narrative    Not on file     Social Determinants of Health     Financial Resource Strain: Not on file   Food Insecurity: Not on file   Transportation Needs: Not on file   Physical Activity: Not on file   Stress: Not on file   Social Connections: Not on file   Housing Stability: Not on file       ADVANCE CARE PLANNING:      PHYSICAL EXAMINATION:  Vitals:    10/03/24 0908   BP: 138/81   BP Location: Left arm   Patient Position: Sitting   Cuff Size: large   Pulse: 87   SpO2: 96%     General: Alert and oriented x3  Affect:  NAD  Head: normocephalic, atraumatic  Eyes: anicteric; no injection  Chest: S1, S2, RRR  Respiratory: CTAB  Gait: Normal; cane user - No  Spine: Normal    ROM:   Lumbar spine  Flexion  full  Extension= full  Cervical Spine  Flexion decreased  Extension decreased  MOTOR EXAMINATION:  UPPER EXTREMITY      LEFT RIGHT   Deltoid 5/5 5/5   Biceps 5/5 5/5   Triceps 5/5 5/5   Brachioradialis 5/5 5/5   Wrist Flexors 5/5 5/5   Wrist Extensors 5/5 5/5   Intrinsic Hand 5/5 5/5    5/5 5/5     LOWER EXTREMITY      LEFT RIGHT   Iliopsoas 5/5 5/5   Quadriceps 5/5 5/5   Foot DF 5/5 5/5   Foot EHL 5/5 5/5   Gastrocnemius 5/5 5/5     PULSES      LEFT RIGHT   Radial 2/4 2/4   Dorsalis Pedis 2/4 2/4   Posterior Tibial 2/4 2/4   Brachial 2/4 2/4     SLR: negative  SIJ tenderness negative  Joint Exam: Shoulder abduction increased pain  TPs:  Present within lumbo-sacral paraspinal muscles    Right Deep tendon reflexes:  Symmetric   Left Deep tendon reflexes: Symmetric   Babinski Reflex: absent bilaterally   Temperature:  normal to touch bilateral upper and lower extremities  Edema - Absent  *    Sensation (light touch/pinprick/temperature):     Right Lower Extremity:  Normal  Left Lower Extremity: Normal  Right Upper Extremity:  Normal  Left Upper Extremity:  Normal    IMAGING:  No results found.    LABS:  Lab Results   Component Value Date    WBC 7.3 08/15/2024    RBC 5.66 08/15/2024    HGB 16.0 08/15/2024    HCT 47.2 08/15/2024    MCV 83.4 08/15/2024    MCH 28.3 08/15/2024    MCHC 33.9 08/15/2024    RDW 14.7 08/15/2024    .0 08/15/2024    MPV 8.7 12/30/2017     Lab Results   Component Value Date     08/15/2024    K 4.6 08/15/2024     08/15/2024    CO2 25.0 08/15/2024    BUN 10 08/15/2024    GLU 76 08/15/2024    CA 9.4 08/15/2024     Lab Results   Component Value Date    INR 1.01 04/08/2021       [place] [facility] MONITORING PROGRAM REVIEWED  No      ASSESSMENT:   64 year old old male with prior lumbar surgery   cervical spinal stenosis   bilateral shoulder issues  Using pain medication 3-4 Norco's a day which helps him stay active and continue to see patient has a chiropractor  PLAN:  RECOMMENDATIONS:  Would like to repeat the cervical epidural steroid injection he had good results with the one in the past and we will go ahead and renew his pain medications for 2 months he will also do a urine screen today  Comprehensive analgesic plan was formulated. Conservative vs. Aggressive measures were discussed at length including pharmacotherapy (eg. Anti- inflammatories, muscle relaxants, neuropathic medications, oral steroids, analgesics), injections, and further testing. Risks and benefits of all options were discussed at length to patients satisfaction during a comprehensive interactive discussion. All questions were answered during extended questions and answer session. Patient agreeable to discussion plan.  Greater than 50% of the time was spent with counseling (nature of discussion centered around pain, therapy, and treatment options), face to face time, time spent reviewing data, obtaining patient information and discussing the care with the patients health care providers.

## 2024-10-04 ENCOUNTER — TELEPHONE (OUTPATIENT)
Dept: PAIN CLINIC | Facility: HOSPITAL | Age: 64
End: 2024-10-04

## 2024-10-04 NOTE — TELEPHONE ENCOUNTER
Prior authorization request completed for: RAYMOND   Authorization #No Prior Authorization Required.   Pre-D: Not Required.   Exclusions/Restrictions: -based on medical necessity.  Covered Benefit: -based on medical necessity.  Authorization dates: n/a  CPT codes approved: 30926  Number of visits/dates of service approved: 1  Physician: Murphy  Location: [facility]    Call Ref#: [id number]  Representative Name: Gama   Insurance Carrier: [employer] (phone #: [phone])     Patient can be scheduled. Routed to Navigator.

## 2024-10-06 LAB — BENZODIAZEPINES CLASS UR: NEGATIVE

## 2024-10-08 LAB
CODEINE UR: NEGATIVE
HYDROCODONE CONF UR: 612 NG/ML
HYDROCODONE UR: POSITIVE
HYDROMORPH UR: NEGATIVE
Lab: NEGATIVE
MORPHINE UR: NEGATIVE
OPIATES CLASS UR: POSITIVE NG/ML

## 2024-11-06 DIAGNOSIS — M96.1 FAILED BACK SYNDROME OF LUMBAR SPINE: Primary | ICD-10-CM

## 2024-11-06 DIAGNOSIS — M96.1 POST LAMINECTOMY SYNDROME: ICD-10-CM

## 2024-11-06 RX ORDER — HYDROCODONE BITARTRATE AND ACETAMINOPHEN 10; 325 MG/1; MG/1
1 TABLET ORAL EVERY 6 HOURS PRN
Qty: 120 TABLET | Refills: 0 | Status: SHIPPED | OUTPATIENT
Start: 2024-11-06 | End: 2024-12-06

## 2024-12-12 DIAGNOSIS — G89.29 CHRONIC MIDLINE LOW BACK PAIN WITHOUT SCIATICA: Primary | ICD-10-CM

## 2024-12-12 DIAGNOSIS — M54.50 CHRONIC MIDLINE LOW BACK PAIN WITHOUT SCIATICA: Primary | ICD-10-CM

## 2024-12-12 RX ORDER — HYDROCODONE BITARTRATE AND ACETAMINOPHEN 10; 325 MG/1; MG/1
1 TABLET ORAL EVERY 6 HOURS PRN
Qty: 120 TABLET | Refills: 0 | Status: SHIPPED | OUTPATIENT
Start: 2024-12-12 | End: 2025-01-11

## 2025-01-17 ENCOUNTER — TELEPHONE (OUTPATIENT)
Dept: PAIN CLINIC | Facility: HOSPITAL | Age: 65
End: 2025-01-17

## 2025-01-17 DIAGNOSIS — M96.1 POST LAMINECTOMY SYNDROME: Primary | ICD-10-CM

## 2025-01-17 RX ORDER — HYDROCODONE BITARTRATE AND ACETAMINOPHEN 10; 325 MG/1; MG/1
1 TABLET ORAL EVERY 6 HOURS PRN
Qty: 120 TABLET | Refills: 0 | Status: SHIPPED | OUTPATIENT
Start: 2025-02-16 | End: 2025-03-18

## 2025-01-17 RX ORDER — HYDROCODONE BITARTRATE AND ACETAMINOPHEN 10; 325 MG/1; MG/1
1 TABLET ORAL EVERY 6 HOURS PRN
Qty: 120 TABLET | Refills: 0 | Status: SHIPPED | OUTPATIENT
Start: 2025-01-17 | End: 2025-02-16

## 2025-01-17 NOTE — TELEPHONE ENCOUNTER
Pt doing well stable   Norco still controlling his pain   Will make office visit soon  Will renew norco

## 2025-02-03 ENCOUNTER — TELEPHONE (OUTPATIENT)
Dept: PAIN CLINIC | Facility: HOSPITAL | Age: 65
End: 2025-02-03

## 2025-02-03 RX ORDER — CELECOXIB 200 MG/1
200 CAPSULE ORAL 2 TIMES DAILY
Qty: 60 CAPSULE | Refills: 1 | Status: SHIPPED | OUTPATIENT
Start: 2025-02-03 | End: 2025-04-04

## 2025-03-24 ENCOUNTER — TELEPHONE (OUTPATIENT)
Dept: PAIN CLINIC | Facility: HOSPITAL | Age: 65
End: 2025-03-24

## 2025-03-24 DIAGNOSIS — M96.1 POST LAMINECTOMY SYNDROME: ICD-10-CM

## 2025-03-24 DIAGNOSIS — M96.1 FAILED BACK SYNDROME OF LUMBAR SPINE: ICD-10-CM

## 2025-03-24 DIAGNOSIS — G89.29 CHRONIC LOW BACK PAIN WITH SCIATICA, SCIATICA LATERALITY UNSPECIFIED, UNSPECIFIED BACK PAIN LATERALITY: ICD-10-CM

## 2025-03-24 DIAGNOSIS — M54.40 CHRONIC LOW BACK PAIN WITH SCIATICA, SCIATICA LATERALITY UNSPECIFIED, UNSPECIFIED BACK PAIN LATERALITY: ICD-10-CM

## 2025-03-24 DIAGNOSIS — M54.16 LUMBAR RADICULOPATHY: ICD-10-CM

## 2025-03-24 RX ORDER — HYDROCODONE BITARTRATE AND ACETAMINOPHEN 10; 325 MG/1; MG/1
1 TABLET ORAL EVERY 6 HOURS PRN
Qty: 120 TABLET | Refills: 0 | Status: SHIPPED | OUTPATIENT
Start: 2025-04-23 | End: 2025-05-23

## 2025-03-24 RX ORDER — HYDROCODONE BITARTRATE AND ACETAMINOPHEN 10; 325 MG/1; MG/1
1 TABLET ORAL EVERY 6 HOURS PRN
Qty: 120 TABLET | Refills: 0 | Status: SHIPPED | OUTPATIENT
Start: 2025-03-24 | End: 2025-04-23

## 2025-03-24 NOTE — TELEPHONE ENCOUNTER
Alfredito is stable   Pain helped by norco 4/day  Instructed to make appt for visit in near future

## 2025-04-04 RX ORDER — CELECOXIB 200 MG/1
200 CAPSULE ORAL 2 TIMES DAILY
Qty: 60 CAPSULE | Refills: 1 | Status: SHIPPED | OUTPATIENT
Start: 2025-04-04

## 2025-04-23 ENCOUNTER — TELEPHONE (OUTPATIENT)
Dept: PAIN CLINIC | Facility: HOSPITAL | Age: 65
End: 2025-04-23

## 2025-04-23 DIAGNOSIS — M96.1 POST LAMINECTOMY SYNDROME: ICD-10-CM

## 2025-04-23 RX ORDER — HYDROCODONE BITARTRATE AND ACETAMINOPHEN 10; 325 MG/1; MG/1
1 TABLET ORAL EVERY 6 HOURS PRN
Qty: 120 TABLET | Refills: 0 | Status: SHIPPED | OUTPATIENT
Start: 2025-04-23 | End: 2025-05-23

## 2025-04-23 NOTE — TELEPHONE ENCOUNTER
Pt instructed to make appt as soon s possible  No new changes   Pain still present but helped 60 % with meds

## 2025-04-29 ENCOUNTER — TELEPHONE (OUTPATIENT)
Dept: ENDOCRINOLOGY CLINIC | Facility: CLINIC | Age: 65
End: 2025-04-29

## 2025-04-29 ENCOUNTER — APPOINTMENT (OUTPATIENT)
Dept: GENERAL RADIOLOGY | Facility: HOSPITAL | Age: 65
End: 2025-04-29
Attending: EMERGENCY MEDICINE
Payer: COMMERCIAL

## 2025-04-29 ENCOUNTER — HOSPITAL ENCOUNTER (EMERGENCY)
Facility: HOSPITAL | Age: 65
Discharge: HOME | End: 2025-04-29
Attending: EMERGENCY MEDICINE
Payer: COMMERCIAL

## 2025-04-29 VITALS
DIASTOLIC BLOOD PRESSURE: 80 MMHG | HEIGHT: 73 IN | WEIGHT: 205 LBS | SYSTOLIC BLOOD PRESSURE: 126 MMHG | OXYGEN SATURATION: 97 % | RESPIRATION RATE: 13 BRPM | TEMPERATURE: 97 F | HEART RATE: 72 BPM | BODY MASS INDEX: 27.17 KG/M2

## 2025-04-29 DIAGNOSIS — G62.9 NEUROPATHY: ICD-10-CM

## 2025-04-29 DIAGNOSIS — R53.83 OTHER FATIGUE: Primary | ICD-10-CM

## 2025-04-29 DIAGNOSIS — E03.9 HYPOTHYROIDISM, UNSPECIFIED TYPE: ICD-10-CM

## 2025-04-29 LAB
ANION GAP SERPL CALC-SCNC: 9 MMOL/L (ref 0–18)
ATRIAL RATE: 69 BPM
BASOPHILS # BLD AUTO: 0.04 X10(3) UL (ref 0–0.2)
BASOPHILS NFR BLD AUTO: 0.6 %
BUN BLD-MCNC: 9 MG/DL (ref 9–23)
BUN/CREAT SERPL: 7.1 (ref 10–20)
CALCIUM BLD-MCNC: 9 MG/DL (ref 8.7–10.4)
CHLORIDE SERPL-SCNC: 106 MMOL/L (ref 98–112)
CO2 SERPL-SCNC: 25 MMOL/L (ref 21–32)
CREAT BLD-MCNC: 1.26 MG/DL (ref 0.7–1.3)
DEPRECATED RDW RBC AUTO: 41.5 FL (ref 35.1–46.3)
EGFRCR SERPLBLD CKD-EPI 2021: 64 ML/MIN/1.73M2 (ref 60–?)
EOSINOPHIL # BLD AUTO: 0.19 X10(3) UL (ref 0–0.7)
EOSINOPHIL NFR BLD AUTO: 3.1 %
ERYTHROCYTE [DISTWIDTH] IN BLOOD BY AUTOMATED COUNT: 13.5 % (ref 11–15)
GLUCOSE BLD-MCNC: 105 MG/DL (ref 70–99)
HCT VFR BLD AUTO: 42.8 % (ref 39–53)
HGB BLD-MCNC: 14.8 G/DL (ref 13–17.5)
IMM GRANULOCYTES # BLD AUTO: 0.01 X10(3) UL (ref 0–1)
IMM GRANULOCYTES NFR BLD: 0.2 %
LYMPHOCYTES # BLD AUTO: 1.45 X10(3) UL (ref 1–4)
LYMPHOCYTES NFR BLD AUTO: 23.3 %
MCH RBC QN AUTO: 29.3 PG (ref 26–34)
MCHC RBC AUTO-ENTMCNC: 34.6 G/DL (ref 31–37)
MCV RBC AUTO: 84.8 FL (ref 80–100)
MONOCYTES # BLD AUTO: 0.56 X10(3) UL (ref 0.1–1)
MONOCYTES NFR BLD AUTO: 9 %
NEUTROPHILS # BLD AUTO: 3.97 X10 (3) UL (ref 1.5–7.7)
NEUTROPHILS # BLD AUTO: 3.97 X10(3) UL (ref 1.5–7.7)
NEUTROPHILS NFR BLD AUTO: 63.8 %
NT-PROBNP SERPL-MCNC: 86 PG/ML (ref ?–125)
OSMOLALITY SERPL CALC.SUM OF ELEC: 289 MOSM/KG (ref 275–295)
P AXIS: 70 DEGREES
P-R INTERVAL: 216 MS
PLATELET # BLD AUTO: 154 10(3)UL (ref 150–450)
POTASSIUM SERPL-SCNC: 4 MMOL/L (ref 3.5–5.1)
Q-T INTERVAL: 398 MS
QRS DURATION: 86 MS
QTC CALCULATION (BEZET): 426 MS
R AXIS: 45 DEGREES
RBC # BLD AUTO: 5.05 X10(6)UL (ref 4.3–5.7)
SODIUM SERPL-SCNC: 140 MMOL/L (ref 136–145)
T AXIS: 64 DEGREES
T3FREE SERPL-MCNC: 7.12 PG/ML (ref 2.4–4.2)
T4 FREE SERPL-MCNC: 1.4 NG/DL (ref 0.8–1.7)
TROPONIN I SERPL HS-MCNC: 3 NG/L (ref ?–53)
TSI SER-ACNC: <0.01 UIU/ML (ref 0.55–4.78)
VENTRICULAR RATE: 69 BPM
WBC # BLD AUTO: 6.2 X10(3) UL (ref 4–11)

## 2025-04-29 PROCEDURE — 80048 BASIC METABOLIC PNL TOTAL CA: CPT | Performed by: EMERGENCY MEDICINE

## 2025-04-29 PROCEDURE — 84439 ASSAY OF FREE THYROXINE: CPT | Performed by: EMERGENCY MEDICINE

## 2025-04-29 PROCEDURE — 93010 ELECTROCARDIOGRAM REPORT: CPT

## 2025-04-29 PROCEDURE — 84481 FREE ASSAY (FT-3): CPT | Performed by: EMERGENCY MEDICINE

## 2025-04-29 PROCEDURE — 71045 X-RAY EXAM CHEST 1 VIEW: CPT | Performed by: EMERGENCY MEDICINE

## 2025-04-29 PROCEDURE — 83880 ASSAY OF NATRIURETIC PEPTIDE: CPT | Performed by: EMERGENCY MEDICINE

## 2025-04-29 PROCEDURE — 85025 COMPLETE CBC W/AUTO DIFF WBC: CPT | Performed by: EMERGENCY MEDICINE

## 2025-04-29 PROCEDURE — 36415 COLL VENOUS BLD VENIPUNCTURE: CPT

## 2025-04-29 PROCEDURE — 99284 EMERGENCY DEPT VISIT MOD MDM: CPT

## 2025-04-29 PROCEDURE — 84484 ASSAY OF TROPONIN QUANT: CPT | Performed by: EMERGENCY MEDICINE

## 2025-04-29 PROCEDURE — 93005 ELECTROCARDIOGRAM TRACING: CPT

## 2025-04-29 PROCEDURE — 84443 ASSAY THYROID STIM HORMONE: CPT | Performed by: EMERGENCY MEDICINE

## 2025-04-29 NOTE — ED INITIAL ASSESSMENT (HPI)
Patient presents to the ED c/o right arm pain x 3 months and SOB x 6 months. Pt states he took two baby aspirins today.  Pt also reports at home BP readings around 250/180 for the past month. Denies chest pain.

## 2025-04-30 NOTE — TELEPHONE ENCOUNTER
Patient is returning call.  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  Please call

## 2025-05-01 NOTE — ED PROVIDER NOTES
Patient Seen in: [facility] Emergency Department    History     Chief Complaint   Patient presents with    Shortness Of Breath    Hypertension     Stated Complaint: R arm pain/hypertension    HPI    Patient complains of being very tired with activity he used to be able to complete easily.  Became concerned past few days reports blood pressure reading >200 systolic.  No cp or headache.  Complains of being difficult to get started in a.m. and has marked decrease in exercise tolerance.  Was seen by md told needed thyroid meds started on 60 increased to 120 now taking 240 under own discretion.  No visual changes. No swelling in ext.  Does not some pain in r arm elbow forearm region.  Had shoulder pain but received cortisone and pain improving..    Alleviating factors: unclear  Exacerbating factors: unclear    Past Medical History[1]    Past Surgical History[2]         Family History[3]    Short Social Hx on File[4]    Review of Systems    Positive for stated complaint: R arm pain/hypertension  Other systems are as noted in HPI.  Constitutional and vital signs reviewed.      All other systems reviewed and negative except as noted above.    PSFH elements reviewed from today and agreed except as otherwise stated in HPI.    Physical Exam     ED Triage Vitals [04/29/25 1246]   /82   Pulse 73   Resp 18   Temp 97 °F (36.1 °C)   Temp src Temporal   SpO2 97 %   O2 Device        Current:/80   Pulse 72   Temp 97 °F (36.1 °C) (Temporal)   Resp 13   Ht 185.4 cm (6' 1\")   Wt 93 kg   SpO2 97%   BMI 27.05 kg/m²    PULSE OX nl  GENERAL: awake alert symmetric facies  HEAD: normocephalic, atraumatic,   EYES: PERRLA, EOMI, conj sclera clear  THROAT: mmm, no lesions  NECK: supple, no meningeal signs  LUNGS: no resp distress, cta bilateral  CARDIO: RRR without murmur  GI: abdomen is soft and non tender, no masses, nl bowel sounds   EXTREMITIES: from, 5/5 strength in all 4 ext, no edema can localize discomfort to r  prox forearm no epicondyle tenderness  NEURO: alert and oiented *3, 2-12 intact, no focal deficit noted  SKIN: good skin turgor, no  rashes  PSYCH: calm, cooperative,    Differential includes:uncontrolled hypertension vs. Faulty blood pressure cuff, hypothyroid with possible cardiac cause of fatigue as well    ED Course     Labs Reviewed   BASIC METABOLIC PANEL (8) - Abnormal; Notable for the following components:       Result Value    Glucose 105 (*)     BUN/CREA Ratio 7.1 (*)     All other components within normal limits   TSH W REFLEX TO FREE T4 - Abnormal; Notable for the following components:    TSH <0.010 (*)     All other components within normal limits   FREE T3 (TRIIODOTHYRONINE) - Abnormal; Notable for the following components:    T3 Free 7.12 (*)     All other components within normal limits   TROPONIN I HIGH SENSITIVITY - Normal   PRO BETA NATRIURETIC PEPTIDE - Normal   T4, FREE (S) - Normal   CBC WITH DIFFERENTIAL WITH PLATELET     EKG    Rate, intervals and axes as noted on EKG Report.  Rate: 69  Rhythm: Sinus Rhythm  Reading: nsr with first degree av block no st changes           MDM       Cardiac Monitor:   Pulse Readings from Last 1 Encounters:   04/29/25 72   , sinus, 72  interpreted by me.    Radiology findings:   No results found.    XR CHEST AP PORTABLE  (CPT=71045)  Result Date: 4/29/2025  CONCLUSION: Normal examination.     Dictated by (CST): Darnell Sawyer MD on 4/29/2025 at 1:38 PM     Finalized by (CST): Darnell Sawyer MD on 4/29/2025 at 1:39 PM            I reviewed xray noted no infiltrates no pneumothorax      Medical Decision Making  Bp 130/60's r and l arm.  Checked his home wrist monitor giving reading of sbp >220, recommend replace.    Problems Addressed:  Hypothyroidism, unspecified type: acute illness or injury     Details: Spoke with endocrine recommend going back to 120 and follow up in office  Other fatigue: acute illness or injury     Details: Unclear primarily thyroid issue  vs. Unrecognized cardiac issue.  Contacted cardiology will fu in office for stress test possible echo    Amount and/or Complexity of Data Reviewed  Labs: ordered. Decision-making details documented in ED Course.  Radiology: ordered and independent interpretation performed. Decision-making details documented in ED Course.  ECG/medicine tests: ordered and independent interpretation performed. Decision-making details documented in ED Course.        Disposition and Plan     Clinical Impression:  1. Other fatigue    2. Neuropathy    3. Hypothyroidism, unspecified type        Disposition:  Discharge    Follow-up:  Lavinia Kim MD  [address] [facility]  [address]  [place] [address]  [phone]    Follow up      Alfredito Jeffrey MD  [address] [facility]  [address]  [place] [address]  [phone]    Follow up        Medications Prescribed:  Discharge Medication List as of 4/29/2025  3:18 PM                         [1]   Past Medical History:   Calculus of kidney    Cancer (HCC)    Prosatate CA    Cataract    Bilateral    Essential hypertension    GERD (gastroesophageal reflux disease)    High blood pressure    Hx of motion sickness    PONV (postoperative nausea and vomiting)    Ruptured hamstring tendon    Spastic bladder   [2]   Past Surgical History:  Procedure Laterality Date    Back surgery  2021    laminectomy; microdiskectomy    Cataract surgery, complex Bilateral     Lithotripsy      Kidney stones removed    Shoulder arthroscopy     [3]   Family History  Problem Relation Age of Onset    Heart Disorder Father     No Known Problems Mother    [4]   Social History  Socioeconomic History    Marital status:    Tobacco Use    Smoking status: Never    Smokeless tobacco: Never   Vaping Use    Vaping status: Never Used   Substance and Sexual Activity    Alcohol use: Never     Alcohol/week: 1.0 standard drink of alcohol     Types: 1 Glasses of wine per week     Comment: 1 glass wine 1 to 2 times for month    Drug use: Never    Other Topics Concern    Left Handed No    Right Handed Yes    Currently spends a great deal of time in the sun No    History of tanning No    Hx of Spending Great Deal of Time in Sun No    Bad sunburns in the past No    Tanning Salons in the Past No    Hx of Radiation Treatments No    Caffeine Concern Yes    Exercise Yes

## 2025-05-16 RX ORDER — CELECOXIB 200 MG/1
200 CAPSULE ORAL 2 TIMES DAILY
Qty: 60 CAPSULE | Refills: 1 | Status: SHIPPED | OUTPATIENT
Start: 2025-05-16

## 2025-05-23 DIAGNOSIS — M96.1 POST LAMINECTOMY SYNDROME: ICD-10-CM

## 2025-05-23 RX ORDER — HYDROCODONE BITARTRATE AND ACETAMINOPHEN 10; 325 MG/1; MG/1
1 TABLET ORAL EVERY 6 HOURS PRN
Qty: 120 TABLET | Refills: 0 | Status: SHIPPED | OUTPATIENT
Start: 2025-05-23 | End: 2025-06-22

## 2025-05-23 RX ORDER — HYDROCODONE BITARTRATE AND ACETAMINOPHEN 10; 325 MG/1; MG/1
1 TABLET ORAL EVERY 6 HOURS PRN
Qty: 120 TABLET | Refills: 0 | Status: SHIPPED | OUTPATIENT
Start: 2025-06-22 | End: 2025-07-22

## 2025-05-23 NOTE — TELEPHONE ENCOUNTER
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

## 2025-06-17 NOTE — TELEPHONE ENCOUNTER
Called patient, no answer, left detailed message to call back x3. No response letter mailed to patients home address on file.

## 2025-06-26 ENCOUNTER — TELEPHONE (OUTPATIENT)
Dept: PAIN CLINIC | Facility: HOSPITAL | Age: 65
End: 2025-06-26

## 2025-07-10 ENCOUNTER — OFFICE VISIT (OUTPATIENT)
Dept: PAIN CLINIC | Facility: HOSPITAL | Age: 65
End: 2025-07-10
Attending: ANESTHESIOLOGY
Payer: COMMERCIAL

## 2025-07-10 VITALS — DIASTOLIC BLOOD PRESSURE: 76 MMHG | OXYGEN SATURATION: 97 % | SYSTOLIC BLOOD PRESSURE: 127 MMHG | HEART RATE: 77 BPM

## 2025-07-10 DIAGNOSIS — M75.50 BURSITIS AND TENDINITIS OF SHOULDER REGION: ICD-10-CM

## 2025-07-10 DIAGNOSIS — M12.811 ROTATOR CUFF TEAR ARTHROPATHY OF RIGHT SHOULDER: ICD-10-CM

## 2025-07-10 DIAGNOSIS — M75.101 ROTATOR CUFF TEAR ARTHROPATHY OF RIGHT SHOULDER: ICD-10-CM

## 2025-07-10 DIAGNOSIS — Z79.891 ENCOUNTER FOR MONITORING OPIOID MAINTENANCE THERAPY: ICD-10-CM

## 2025-07-10 DIAGNOSIS — M96.1 POST LAMINECTOMY SYNDROME: Primary | ICD-10-CM

## 2025-07-10 DIAGNOSIS — M54.16 LUMBAR RADICULOPATHY: ICD-10-CM

## 2025-07-10 DIAGNOSIS — M48.02 CERVICAL SPINAL STENOSIS: ICD-10-CM

## 2025-07-10 DIAGNOSIS — Z51.81 ENCOUNTER FOR MONITORING OPIOID MAINTENANCE THERAPY: ICD-10-CM

## 2025-07-10 DIAGNOSIS — M75.90 BURSITIS AND TENDINITIS OF SHOULDER REGION: ICD-10-CM

## 2025-07-10 LAB
AMPHET UR QL SCN: NEGATIVE
BARBITURATES UR QL SCN: NEGATIVE
BENZODIAZ UR QL SCN: NEGATIVE
CANNABINOIDS UR QL SCN: NEGATIVE
COCAINE UR QL: NEGATIVE
CREAT UR-SCNC: 32.4 MG/DL
FENTANYL UR QL SCN: NEGATIVE
MDMA UR QL SCN: NEGATIVE
METHADONE UR QL SCN: NEGATIVE
OXYCODONE UR QL SCN: NEGATIVE
PCP UR QL SCN: NEGATIVE

## 2025-07-10 PROCEDURE — 99214 OFFICE O/P EST MOD 30 MIN: CPT | Performed by: ANESTHESIOLOGY

## 2025-07-10 PROCEDURE — 80361 OPIATES 1 OR MORE: CPT | Performed by: ANESTHESIOLOGY

## 2025-07-10 PROCEDURE — 80307 DRUG TEST PRSMV CHEM ANLYZR: CPT | Performed by: ANESTHESIOLOGY

## 2025-07-10 RX ORDER — HYDROCODONE BITARTRATE AND ACETAMINOPHEN 10; 325 MG/1; MG/1
1 TABLET ORAL EVERY 6 HOURS PRN
Qty: 120 TABLET | Refills: 0 | Status: SHIPPED | OUTPATIENT
Start: 2025-08-20 | End: 2025-09-19

## 2025-07-10 RX ORDER — HYDROCODONE BITARTRATE AND ACETAMINOPHEN 10; 325 MG/1; MG/1
1 TABLET ORAL EVERY 6 HOURS PRN
Qty: 120 TABLET | Refills: 0 | Status: SHIPPED | OUTPATIENT
Start: 2025-07-21 | End: 2025-08-20

## 2025-07-10 NOTE — PROGRESS NOTES
Patient presents in office today with reported pain in Lower back     Current pain level reported = 2/10     Last reported dose of Norco        Narcotic Contract renewal 07.10.25     Urine Drug screen 07.10.25

## 2025-07-10 NOTE — PATIENT INSTRUCTIONS
Refill policies:    Allow 2-3 business days for refills; controlled substances may take longer.  Contact your pharmacy at least 5 days prior to running out of medication and have them send an electronic request or submit request through the “request refill” option in your [employer] account.  Refills are not addressed on weekends; covering physicians do not authorize routine medications on weekends.  No narcotics or controlled substances are refilled after noon on Fridays or by on call physicians.  By law, narcotics must be electronically prescribed.  A 30 day supply with no refills is the maximum allowed.  If your prescription is due for a refill, you may be due for a follow up appointment.  To best provide you care, patients receiving routine medications need to be seen at least once a year.  Patients receiving narcotic/controlled substance medications need to be seen at least once every 3 months.  In the event that your preferred pharmacy does not have the requested medication in stock (e.g. Backordered), it is your responsibility to find another pharmacy that has the requested medication available.  We will gladly send a new prescription to that pharmacy at your request.    Scheduling Tests:    If your physician has ordered radiology tests such as MRI or CT scans, please contact Central Scheduling at [phone] right away to schedule the test.  Once scheduled, the [facility] Centralized Referral Team will work with your insurance carrier to obtain pre-certification or prior authorization.  Depending on your insurance carrier, approval may take 3-10 days.  It is highly recommended patients assure they have received an authorization before having a test performed.  If test is done without insurance authorization, patient may be responsible for the entire amount billed.      Precertification and Prior Authorizations:  If your physician has recommended that you have a procedure or additional testing performed the [facility]  Centralized Referral Team will contact your insurance carrier to obtain pre-certification or prior authorization.    You are strongly encouraged to contact your insurance carrier to verify that your procedure/test has been approved and is a COVERED benefit.  Although the [facility] Centralized Referral Team does its due diligence, the insurance carrier gives the disclaimer that \"Although the procedure is authorized, this does not guarantee payment.\"    Ultimately the patient is responsible for payment.   Thank you for your understanding in this matter.  Paperwork Completion:  If you require FMLA or disability paperwork for your recovery, please make sure to either drop it off or have it faxed to our office at [phone]. Be sure the form has your name and date of birth on it.  The form will be faxed to our Forms Department and they will complete it for you.  There is a 25$ fee for all forms that need to be filled out.  Please be aware there is a 10-14 day turnaround time.  You will need to sign a release of information (JEANNA) form if your paperwork does not come with one.  You may call the Forms Department with any questions at [phone].  Their fax number is [phone].

## 2025-07-10 NOTE — CHRONIC PAIN
Initial Consultation Note      HISTORY OF PRESENT ILLNESS:  Alfredito Prather Jr. is a 64 year old old male referred to the pain clinic  for evaluation treatment of his chronic pain Alfredito is a very nice 64-year-old white male who we known at the pain clinic for many years he has had a prior lumbar surgery by Dr. 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  He takes anywhere from 2-4 Norco a day for the pain control does not have any side effects on the pain medication  His main problem is the lumbar pain as well as the neck pain and then when he does any activity the hamstring and shoulders pain intensifies    PAIN COURSE AND PREVIOUS INTERVENTIONS:    Interventions: Epidurals pain medication anti-inflammatorie  Adjuvants:      BLOOD THINNING MEDICATIONS:  None    CURRENT MEDICATIONS:  Current Outpatient Medications   Medication Sig Dispense Refill    HYDROcodone-acetaminophen  MG Oral Tab Take 1 tablet by mouth every 6 (six) hours as needed for Pain. 120 tablet 0    CELECOXIB 200 MG Oral Cap TAKE 1 CAPSULE(200 MG) BY MOUTH TWICE DAILY 60 capsule 1    tamsulosin 0.4 MG Oral Cap Take 0.4 mg by mouth in the morning and 0.4 mg before bedtime.      Naloxone HCl 4 MG/0.1ML Nasal Liquid 4 mg by Nasal route as needed. If patient remains unresponsive, repeat  dose in other nostril 2-5 minutes after first dose. (Patient not taking: Reported on 1/30/2024) 1 kit 0    Phenazopyridine HCl (AZO TABS) 95 MG Oral Tab Take 1 tablet (97.2 mg total) by mouth 3 (three) times daily as needed for Pain. 21 tablet 6    pentosan polysulfate (ELMIRON) 100 MG Oral Cap Take 2 capsules (200 mg total) by mouth in the morning and 2 capsules (200 mg total) before bedtime. 1200 capsule 5    hydrOXYzine 25 MG Oral Tab Take 1-2 tablets (25-50 mg total) by mouth at bedtime. 90 tablet 5    DUTASTERIDE 0.5 MG Oral Cap TAKE 1 CAPSULE(0.5 MG) BY MOUTH DAILY (Patient taking differently: Take 0.5 mg by mouth nightly.) 90 capsule 0    DULoxetine 30 MG Oral Cap DR Particles Take 2 capsules by mouth nightly (Patient taking differently: Take 30 mg by mouth nightly as needed. Take 2 capsules by mouth nightly) 180 capsule 0    ZOLPIDEM 5 MG Oral Tab TAKE 1 TABLET BY MOUTH EVERY DAY AT BEDTIME (Patient taking differently: Take 5 mg by mouth nightly as needed.) 30 tablet 0    ondansetron (ZOFRAN) 8 MG tablet Take 8 mg by mouth every 8 (eight) hours as needed.      amLODIPine 5 MG Oral Tab Take 5 mg by mouth every morning.      METOPROLOL TARTRATE 25 MG Oral Tab TAKE 1 TABLET(25 MG) BY MOUTH TWICE DAILY (Patient taking differently: Take 25 mg by mouth every morning.) 180 tablet 3    Pantoprazole Sodium 40 MG Oral Tab EC Take 40 mg by mouth every morning before breakfast.          ALLERGIES:  No Known Allergies    SURGICAL HISTORY:  Past Surgical History:   Procedure Laterality Date    Back surgery  2021    laminectomy; microdiskectomy    Cataract surgery, complex Bilateral     Lithotripsy      Kidney stones removed    Shoulder arthroscopy         REVIEW OF SYSTEMS:   A comprehensive review of systems was negative except for:     MEDICAL HISTORY:  Patient Active Problem List   Diagnosis    Chronic pain of right knee    Internal derangement of knee joint    Pain of right hip joint    Acute pain of left knee     Internal derangement of left knee    Rotator cuff tear arthropathy of right shoulder    Impingement syndrome of right shoulder    Hamstring injury    Essential hypertension    Impingement syndrome of left shoulder    Acute pain of left shoulder    Labral tear of long head of biceps tendon, right, initial encounter    Calf swelling    Right calf pain    Left foot pain    Arthritis of left shoulder region    Partial nontraumatic tear of rotator cuff, left    Tendinitis of long head of biceps    Chronic pain in right shoulder    Localized edema    Chronic night sweats    Stage 3a chronic kidney disease (HCC)    Chronic low back pain without sciatica    B12 deficiency     Past Medical History:    Calculus of kidney    Cancer ([facility])    Prosatate CA    Cataract    Bilateral    Essential hypertension    GERD (gastroesophageal reflux disease)    High blood pressure    Hx of motion sickness    PONV (postoperative nausea and vomiting)    Ruptured hamstring tendon    Spastic bladder       FAMILY HISTORY:  Family History   Problem Relation Age of Onset    Heart Disorder Father     No Known Problems Mother        SOCIAL HISTORY:  Social History     Socioeconomic History    Marital status:      Spouse name: Not on file    Number of children: Not on file    Years of education: Not on file    Highest education level: Not on file   Occupational History    Not on file   Tobacco Use    Smoking status: Never    Smokeless tobacco: Never   Vaping Use    Vaping status: Never Used   Substance and Sexual Activity    Alcohol use: Never     Alcohol/week: 1.0 standard drink of alcohol     Types: 1 Glasses of wine per week     Comment: 1 glass wine 1 to 2 times for month    Drug use: Never    Sexual activity: Not on file   Other Topics Concern    Left Handed No    Right Handed Yes    Currently spends a great deal of time in the sun No    Past Sunlamp Treatments for Acne Not Asked    History of tanning No    Hx of Spending Great Deal of  Time in Sun No    Bad sunburns in the past No    Tanning Salons in the Past No    Hx of Radiation Treatments No    Regular use of sun block Not Asked     Service Not Asked    Blood Transfusions Not Asked    Caffeine Concern Yes    Occupational Exposure Not Asked    Hobby Hazards Not Asked    Sleep Concern Not Asked    Stress Concern Not Asked    Weight Concern Not Asked    Special Diet Not Asked    Back Care Not Asked    Exercise Yes    Bike Helmet Not Asked    Seat Belt Not Asked    Self-Exams Not Asked   Social History Narrative    Not on file     Social Drivers of Health     Food Insecurity: Not on file   Transportation Needs: Not on file   Housing Stability: Not on file       ADVANCE CARE PLANNING:      PHYSICAL EXAMINATION:  Vitals:    07/10/25 0842   BP: 127/76   BP Location: Left arm   Patient Position: Sitting   Cuff Size: large   Pulse: 77   SpO2: 97%     General: Alert and oriented x3  Affect:  NAD  Head: normocephalic, atraumatic  Eyes: anicteric; no injection  Chest: S1, S2, RRR  Respiratory: CTAB  Gait: Normal; cane user - No  Spine: Normal    ROM:   Lumbar spine  Flexion  full  Extension= full  Cervical Spine  Flexion decreased  Extension decreased  MOTOR EXAMINATION:  UPPER EXTREMITY      LEFT RIGHT   Deltoid 5/5 5/5   Biceps 5/5 5/5   Triceps 5/5 5/5   Brachioradialis 5/5 5/5   Wrist Flexors 5/5 5/5   Wrist Extensors 5/5 5/5   Intrinsic Hand 5/5 5/5    5/5 5/5     LOWER EXTREMITY      LEFT RIGHT   Iliopsoas 5/5 5/5   Quadriceps 5/5 5/5   Foot DF 5/5 5/5   Foot EHL 5/5 5/5   Gastrocnemius 5/5 5/5     PULSES      LEFT RIGHT   Radial 2/4 2/4   Dorsalis Pedis 2/4 2/4   Posterior Tibial 2/4 2/4   Brachial 2/4 2/4     SLR: negative  SIJ tenderness negative  Joint Exam: Shoulder abduction increased pain  TPs:  Present within lumbo-sacral paraspinal muscles    Right Deep tendon reflexes: Symmetric   Left Deep tendon reflexes: Symmetric   Babinski Reflex: absent bilaterally   Temperature:  normal  to touch bilateral upper and lower extremities  Edema - Absent  *    Sensation (light touch/pinprick/temperature):     Right Lower Extremity:  Normal  Left Lower Extremity: Normal  Right Upper Extremity:  Normal  Left Upper Extremity:  Normal    IMAGING:  Reviewed all films    LABS:  Lab Results   Component Value Date    WBC 6.2 04/29/2025    RBC 5.05 04/29/2025    HGB 14.8 04/29/2025    HCT 42.8 04/29/2025    MCV 84.8 04/29/2025    MCH 29.3 04/29/2025    MCHC 34.6 04/29/2025    RDW 13.5 04/29/2025    .0 04/29/2025    MPV 8.7 12/30/2017     Lab Results   Component Value Date     04/29/2025    K 4.0 04/29/2025     04/29/2025    CO2 25.0 04/29/2025    BUN 9 04/29/2025     (H) 04/29/2025    CA 9.0 04/29/2025     Lab Results   Component Value Date    INR 1.01 04/08/2021       [place] [facility] MONITORING PROGRAM REVIEWED  Yes urine screen today also    ASSESSMENT:   64 year old old male with prior lumbar surgery   cervical spinal stenosis   bilateral shoulder rotator cuff tears chronic hamstring tear  Using pain medication 3-4 Norco's a day which helps him stay active and continue to see patient has a chiropractor  PLAN:  RECOMMENDATIONS:  Continue with the current pain medication dosage being a chiropractory he is knowledgeable in strength strengthening and stretching exercises  Does not want to pursue additional surgery at this time   and urine screen checked  Comprehensive analgesic plan was formulated. Conservative vs. Aggressive measures were discussed at length including pharmacotherapy (eg. Anti- inflammatories, muscle relaxants, neuropathic medications, oral steroids, analgesics), injections, and further testing. Risks and benefits of all options were discussed at length to patients satisfaction during a comprehensive interactive discussion. All questions were answered during extended questions and answer session. Patient agreeable to discussion plan. Greater than 50% of the time  was spent with counseling (nature of discussion centered around pain, therapy, and treatment options), face to face time, time spent reviewing data, obtaining patient information and discussing the care with the patients health care providers.

## 2025-07-15 LAB
CODEINE UR: NEGATIVE
HYDROCODONE CONF UR: 608 NG/ML
HYDROCODONE UR: POSITIVE
HYDROMORPH UR: NEGATIVE
MORPHINE UR: NEGATIVE
OPIATES CLASS UR: POSITIVE NG/ML

## (undated) DEVICE — SUCTION CANISTER, 3000CC,SAFELINER: Brand: DEROYAL

## (undated) DEVICE — WATER STERILE AQUALITE 1000ML

## (undated) DEVICE — SUTURE PROLENE 6-0 BV-1

## (undated) DEVICE — SOLUTION  .9 1000ML BTL

## (undated) DEVICE — SUTURE VICRYL 3-0 J761D

## (undated) DEVICE — 3M™ STERI-STRIP™ REINFORCED ADHESIVE SKIN CLOSURES, R1547, 1/2 IN X 4 IN (12 MM X 100 MM), 6 STRIPS/ENVELOPE: Brand: 3M™ STERI-STRIP™

## (undated) DEVICE — SOL H2O 3000ML IRRIG

## (undated) DEVICE — TOWEL OR BLU 16X26 STRL

## (undated) DEVICE — ENCORE® LATEX MICRO SIZE 7, STERILE LATEX POWDER-FREE SURGICAL GLOVE: Brand: ENCORE

## (undated) DEVICE — PEN: MARKING STD PT 100/CS: Brand: MEDICAL ACTION INDUSTRIES

## (undated) DEVICE — SWABSTICK PVP IOD 4IN PDI PRP

## (undated) DEVICE — C-ARM: Brand: UNBRANDED

## (undated) DEVICE — NON-ADHERENT PAD PREPACK: Brand: TELFA

## (undated) DEVICE — DRESSING BIOPATCH 1X4 CNTR

## (undated) DEVICE — SLEEVE KENDALL SCD EXPRESS MED

## (undated) DEVICE — SUTURE NUROLON 4-0 TF

## (undated) DEVICE — NEEDLE SPINAL 25X3-1/2 BLUE

## (undated) DEVICE — BANDAID COVERLET 1X3

## (undated) DEVICE — COVER SGL STRL LGHT HNDL BLU

## (undated) DEVICE — MEDI-VAC NON-CONDUCTIVE SUCTION TUBING: Brand: CARDINAL HEALTH

## (undated) DEVICE — ENCORE® LATEX MICRO SIZE 8, STERILE LATEX POWDER-FREE SURGICAL GLOVE: Brand: ENCORE

## (undated) DEVICE — GAMMEX® PI HYBRID SIZE 7, STERILE POWDER-FREE SURGICAL GLOVE, POLYISOPRENE AND NEOPRENE BLEND: Brand: GAMMEX

## (undated) DEVICE — GAUZE SPONGES,12 PLY: Brand: CURITY

## (undated) DEVICE — ANGIO CATH 16GX2

## (undated) DEVICE — CYSTO PACK: Brand: MEDLINE INDUSTRIES, INC.

## (undated) DEVICE — GAMMEX® PI HYBRID SIZE 8, STERILE POWDER-FREE SURGICAL GLOVE, POLYISOPRENE AND NEOPRENE BLEND: Brand: GAMMEX

## (undated) DEVICE — 12 ML SYRINGE LUER-LOCK TIP: Brand: MONOJECT

## (undated) DEVICE — VIAL ISOVUE 300 10X100ML

## (undated) DEVICE — UROLOGY DRAIN BAG

## (undated) DEVICE — GOWN SURG AERO BLUE PERF LG

## (undated) DEVICE — SYRINGE 10ML LL CONTRL SYRINGE

## (undated) DEVICE — DISPOSABLE REFLECTIVE MARKER SPHERES (DRMS) ATTACHED TO REFERENCE FRAMES AND SURGICAL INSTRUMENTS FOR USE DURING SURGICAL NAVIGATION IN IMAGE GUIDED SURGERY.  ONE RETAIL CARTON IS MADE UP OF 3 SPHERES PER TRAY, 30 TRAYS, 90 SPHERES TOTAL.: Brand: DISPOSABLE REFLECTIVE MARKER SPHERE (DRMS)

## (undated) DEVICE — CONTAINER SPEC STR 4OZ GRY LID

## (undated) DEVICE — X-RAY DETECTABLE SPONGES,16 PLY: Brand: VISTEC

## (undated) DEVICE — PRECISION MATCH HEAD

## (undated) DEVICE — SOL  .9 1000ML BTL

## (undated) DEVICE — 3M™ TEGADERM™ TRANSPARENT FILM DRESSING, 1626W, 4 IN X 4-3/4 IN (10 CM X 12 CM), 50 EACH/CARTON, 4 CARTON/CASE: Brand: 3M™ TEGADERM™

## (undated) DEVICE — CATH URET CONE TIP 8FR 138008

## (undated) DEVICE — NON-ADHERENT PADS PREPACK: Brand: TELFA

## (undated) DEVICE — SUTURE MONOCRYL 4-0 PS-2

## (undated) DEVICE — DRAPE 105X46IN LEN ZS CLR MSCP

## (undated) DEVICE — SPK10186 WILSON TABLE KIT: Brand: SPK10186 WILSON TABLE KIT

## (undated) DEVICE — STANDARD HYPODERMIC NEEDLE,POLYPROPYLENE HUB: Brand: MONOJECT

## (undated) DEVICE — OMNIPAQUE 240ML VIAL

## (undated) DEVICE — COVER STND 54X23IN MAYO REINF

## (undated) DEVICE — ENCORE® LATEX MICRO SIZE 6.5, STERILE LATEX POWDER-FREE SURGICAL GLOVE: Brand: ENCORE

## (undated) DEVICE — ENCORE® LATEX ACCLAIM SIZE 8.5, STERILE LATEX POWDER-FREE SURGICAL GLOVE: Brand: ENCORE

## (undated) DEVICE — LAMINECTOMY: Brand: MEDLINE INDUSTRIES, INC.

## (undated) DEVICE — MICRO KOVER: Brand: UNBRANDED

## (undated) DEVICE — WRAP THRP PLRCR500 BCK DRSG

## (undated) DEVICE — 60 ML SYRINGE LUER-LOCK TIP: Brand: MONOJECT

## (undated) DEVICE — SUTURE MONOCRYL 4-0 Y845G

## (undated) DEVICE — DISPOSABLE SLIM BIPOLAR FORCEPS, NON-STICK,: Brand: SPETZLER-MALIS

## (undated) DEVICE — NEEDLE SPINAL 22X3-1/2 BLK

## (undated) DEVICE — FLOSEAL HEMOSTATIC MATRIX, 5ML: Brand: FLOSEAL HEMOSTATIC MATRIX

## (undated) DEVICE — 9534HP TRANSPARENT DRSG W/FRAME: Brand: 3M™ TEGADERM™

## (undated) DEVICE — 3 ML SYRINGE LUER-LOCK TIP: Brand: MONOJECT

## (undated) DEVICE — CHLORAPREP 26ML APPLICATOR

## (undated) DEVICE — SUTURE VICRYL 0 CT-2

## (undated) NOTE — LETTER
[address]  [address] [place], [place]      Authorization for Surgical Operation and Procedure     Date:___________                                                                                                         Time:_______ 4.   Should the need arise during my operation or immediate post-operative period, I also consent to the administration of blood and/or blood products.   Further, I understand that despite careful testing and screening of blood or blood products by meghan 8.   I recognize that in the event my procedure results in extended X-Ray/fluoroscopy time, I may develop a skin reaction. 9.  If I have a Do Not Attempt Resuscitation (DNAR) order in place, that status will be suspended while in the operating room, proc STATEMENT OF PHYSICIAN My signature below affirms that prior to the time of the procedure; I have explained to the patient and/or his/her legal representative, the risks and benefits involved in the proposed treatment and any reasonable alternative to the

## (undated) NOTE — LETTER
[address] [facility]  [address], [address] [facility]  [address]  [phone]        Dear Lamar Aschoff,      I had the pleasure of seeing your patient, Jayjay Carranza. on 11/3/2017.      Below please find a summary of our

## (undated) NOTE — LETTER
April 11, 2018 [address] [facility]      Dear Lamar Aschoff:    The following are the results of your recent tests ordered. Please review the list of test results.   Your result is the number on the left; we have also supplied the rang [employer] Activation Code: [id number]  Expires: 4/21/2018  6:00 PM         Follow the on-screen instructions to complete your registration.     Sincerely Yours,  [facility] - Cardiology Department

## (undated) NOTE — LETTER
[address]  Authorization for Surgical Operation and Procedure                                                                                           1. I hereby Marita Abdalla MD, my physician and his/her assistants (if applicable), which may include medical students, residents, and/or fellows, to perform the following surgical operation/ procedure and administer such anesthesia as may be determined necessary by my physician: Operation/Procedure name (s) Cystoscopy, bilateral retrograde pyelograms, possible hydrodistension of bladder, possible dilation of obstruction, possible biopsy on Alfredito Bartholomew Coral.   2.   I recognize that during the surgical operation/procedure, unforeseen conditions may necessitate additional or different procedures than those listed above. I, therefore, further authorize and request that the above-named surgeon, assistants, or designees perform such procedures as are, in their judgment, necessary and desirable. 3.   My surgeon/physician has discussed prior to my surgery the potential benefits, risks and side effects of this procedure; the likelihood of achieving goals; and potential problems that might occur during recuperation. They also discussed reasonable alternatives to the procedure, including risks, benefits, and side effects related to the alternatives and risks related to not receiving this procedure. I have had all my questions answered and I acknowledge that no guarantee has been made as to the result that may be obtained. 4.   Should the need arise during my operation/procedure, which includes change of level of care prior to discharge, I also consent to the administration of blood and/or blood products.   Further, I understand that despite careful testing and screening of blood or blood products by collecting agencies, I may still be subject to ill effects as a result of receiving a blood transfusion and/or blood products. The following are some, but not all, of the potential risks that can occur: fever and allergic reactions, hemolytic reactions, transmission of diseases such as Hepatitis, AIDS and Cytomegalovirus (CMV) and fluid overload. In the event that I wish to have an autologous transfusion of my own blood, or a directed donor transfusion, I will discuss this with my physician. Check only if Refusing Blood or Blood Products  I understand refusal of blood or blood products as deemed necessary by my physician may have serious consequences to my condition to include possible death. I hereby assume responsibility for my refusal and release the hospital, its personnel, and my physicians from any responsibility for the consequences of my refusal.           ____ Refuse      5. I authorize the use of any specimen, organs, tissues, body parts or foreign objects that may be removed from my body during the operation/procedure for diagnosis, research or teaching purposes and their subsequent disposal by hospital authorities. I also authorize the release of specimen test results and/or written reports to my treating physician on the hospital medical staff or other referring or consulting physicians involved in my care, at the discretion of the Pathologist or my treating physician. 6.   I consent to the photographing or videotaping of the operations or procedures to be performed, including appropriate portions of my body for medical, scientific, or educational purposes, provided my identity is not revealed by the pictures or by descriptive texts accompanying them. If the procedure has been photographed/videotaped, the surgeon will obtain the original picture, image, videotape or CD.   The hospital will not be responsible for storage, release or maintenance of the picture, image, tape or CD.    7.   I consent to the presence of a  or observers in the operating room as deemed necessary by my physician or their designees. 8.   I recognize that in the event my procedure results in extended X-Ray/fluoroscopy time, I may develop a skin reaction. 9. If I have a Do Not Attempt Resuscitation (DNAR) order in place, that status will be suspended while in the operating room, procedural suite, and during the recovery period unless otherwise explicitly stated by me (or a person authorized to consent on my behalf). The surgeon or my attending physician will determine when the applicable recovery period ends for purposes of reinstating the DNAR order. 10. Patients having a sterilization procedure: I understand that if the procedure is successful the results will be permanent and it will therefore be impossible for me to inseminate, conceive, or bear children. I also understand that the procedure is intended to result in sterility, although the result has not been guaranteed. 11. I acknowledge that my physician has explained sedation/analgesia administration to me including the risk and benefits I consent to the administration of sedation/analgesia as may be necessary or desirable in the judgment of my physician.     I CERTIFY THAT I HAVE READ AND FULLY UNDERSTAND THE ABOVE CONSENT TO OPERATION and/or OTHER PROCEDURE.     _________________________________________ _________________________________     ___________________________________  Signature of Patient     Signature of Responsible Person                   Printed Name of Responsible Person                              _________________________________________ ______________________________        ___________________________________  Signature of Witness         Date  Time         Relationship to Patient    STATEMENT OF PHYSICIAN My signature below affirms that prior to the time of the procedure; I have explained to the patient and/or his/her legal representative, the risks and benefits involved in the proposed treatment and any reasonable alternative to the proposed treatment. I have also explained the risks and benefits involved in refusal of the proposed treatment and alternatives to the proposed treatment and have answered the patient's questions. If I have a significant financial interest in a co-management agreement or a significant financial interest in any product or implant, or other significant relationship used in this procedure/surgery, I have disclosed this and had a discussion with my patient.     _______________________________________________________________ _____________________________  (Signature of Physician)                                                                                         (Date)                                   (Time)  Patient Name: Lian Del Cid     : 1960   Printed: 10/31/2022      Medical Record #: [id number]                                              Page 1 of 1

## (undated) NOTE — LETTER
February 26, 2021    Isrrael Clark. [address] [facility]      Dear [address] [place] [address]:    The following are the results of your recent tests ordered by [facility]. Please review the list of test results.   Your result is the nu Foods high in potassium include: Apricots (fresh or dried), Bananas, Oranges and orange juice, Grapefruit juice, Tomatoes, tomato sauce, and tomato juice, Spinach, Green, leafy vegetables, including salad greens, kale, broccoli, chard, and collards, Melons

## (undated) NOTE — LETTER
[address]  [facility] [address]  [place] [address]  [phone]        Dear Roseline Laughlin had the pleasure of seeing your patient, Georgina Valle. on 1/20/2022. Below please find a summary of our visit.   If you h Trial of duloxetine for his neuropathic pain. He states he has failed Neurontin, Lyrica.

## (undated) NOTE — LETTER
[address] [facility] [address] [place], [place]  Authorization for Surgical Operation and Procedure                                                                                           1. I hereby Kusum Hernandez MD, my physician and his/her assistants (if applicable), which may include medical students, residents, and/or fellows, to perform the following surgical operation/ procedure and administer such anesthesia as may be determined necessary by my physician: Operation/Procedure name (s) Cystoscopy, bilateral retrograde pyelogram, possible hydrodistension of bladder, possible dilation of obstruction, possible biopsy on Alfredito Henry.   2.   I recognize that during the surgical operation/procedure, unforeseen conditions may necessitate additional or different procedures than those listed above. I, therefore, further authorize and request that the above-named surgeon, assistants, or designees perform such procedures as are, in their judgment, necessary and desirable. 3.   My surgeon/physician has discussed prior to my surgery the potential benefits, risks and side effects of this procedure; the likelihood of achieving goals; and potential problems that might occur during recuperation. They also discussed reasonable alternatives to the procedure, including risks, benefits, and side effects related to the alternatives and risks related to not receiving this procedure. I have had all my questions answered and I acknowledge that no guarantee has been made as to the result that may be obtained. 4.   Should the need arise during my operation/procedure, which includes change of level of care prior to discharge, I also consent to the administration of blood and/or blood products.   Further, I understand that despite careful testing and screening of blood or blood products by collecting agencies, I may still be subject to ill effects as a result of receiving a blood transfusion and/or blood products. The following are some, but not all, of the potential risks that can occur: fever and allergic reactions, hemolytic reactions, transmission of diseases such as Hepatitis, AIDS and Cytomegalovirus (CMV) and fluid overload. In the event that I wish to have an autologous transfusion of my own blood, or a directed donor transfusion, I will discuss this with my physician. Check only if Refusing Blood or Blood Products  I understand refusal of blood or blood products as deemed necessary by my physician may have serious consequences to my condition to include possible death. I hereby assume responsibility for my refusal and release the hospital, its personnel, and my physicians from any responsibility for the consequences of my refusal.           ____ Refuse      5. I authorize the use of any specimen, organs, tissues, body parts or foreign objects that may be removed from my body during the operation/procedure for diagnosis, research or teaching purposes and their subsequent disposal by hospital authorities. I also authorize the release of specimen test results and/or written reports to my treating physician on the hospital medical staff or other referring or consulting physicians involved in my care, at the discretion of the Pathologist or my treating physician. 6.   I consent to the photographing or videotaping of the operations or procedures to be performed, including appropriate portions of my body for medical, scientific, or educational purposes, provided my identity is not revealed by the pictures or by descriptive texts accompanying them. If the procedure has been photographed/videotaped, the surgeon will obtain the original picture, image, videotape or CD.   The hospital will not be responsible for storage, release or maintenance of the picture, image, tape or CD.    7.   I consent to the presence of a  or observers in the operating room as deemed necessary by my physician or their designees. 8.   I recognize that in the event my procedure results in extended X-Ray/fluoroscopy time, I may develop a skin reaction. 9. If I have a Do Not Attempt Resuscitation (DNAR) order in place, that status will be suspended while in the operating room, procedural suite, and during the recovery period unless otherwise explicitly stated by me (or a person authorized to consent on my behalf). The surgeon or my attending physician will determine when the applicable recovery period ends for purposes of reinstating the DNAR order. 10. Patients having a sterilization procedure: I understand that if the procedure is successful the results will be permanent and it will therefore be impossible for me to inseminate, conceive, or bear children. I also understand that the procedure is intended to result in sterility, although the result has not been guaranteed. 11. I acknowledge that my physician has explained sedation/analgesia administration to me including the risk and benefits I consent to the administration of sedation/analgesia as may be necessary or desirable in the judgment of my physician.     I CERTIFY THAT I HAVE READ AND FULLY UNDERSTAND THE ABOVE CONSENT TO OPERATION and/or OTHER PROCEDURE.     _________________________________________ _________________________________     ___________________________________  Signature of Patient     Signature of Responsible Person                   Printed Name of Responsible Person                              _________________________________________ ______________________________        ___________________________________  Signature of Witness         Date  Time         Relationship to Patient    STATEMENT OF PHYSICIAN My signature below affirms that prior to the time of the procedure; I have explained to the patient and/or his/her legal representative, the risks and benefits involved in the proposed treatment and any reasonable alternative to the proposed treatment. I have also explained the risks and benefits involved in refusal of the proposed treatment and alternatives to the proposed treatment and have answered the patient's questions. If I have a significant financial interest in a co-management agreement or a significant financial interest in any product or implant, or other significant relationship used in this procedure/surgery, I have disclosed this and had a discussion with my patient.     _______________________________________________________________ _____________________________  (Signature of Physician)                                                                                         (Date)                                   (Time)  Patient Name: Halina Chavez.     : 1960   Printed: 10/31/2022      Medical Record #: [id number]                                              Page 1 of 1

## (undated) NOTE — MR AVS SNAPSHOT
After Visit Summary   11/15/2019    Ian Broderick. MRN: [id number]           Visit Information     Date & Time  11/15/2019 12:30 PM Provider  Dianna Gotti [facility]  Phone  [phone] PATADAY 0.2 % Ophthalmic Solution     Pantoprazole Sodium 40 MG Oral Tab EC Take 40 mg by mouth every morning before breakfast.    Nutritional Supplements (JUICE PLUS FIBRE OR) Take by mouth. Cyanocobalamin (B-12) 500 MCG Oral Tab Take by mouth.     Cho 6. Create a [employer] password. You can change your password at any time. 7. Choose a Security Question and enter your Answer and click Next. This can be used at a later time if you forget your password. 8. Enter your e-mail address.  You will receive e-ma track your progress   You don’t need to join a gym. Home exercises work great. Put more priority on exercise in your life           [facility] now offers Video Visits through [address] for adult and pediatric patients.   Video Visits are available Monday - Friday Average cost  $120*       EMERGENCY ROOM         Life-threatening emergencies needing immediate intervention   at a hospital emergency room.       Average cost  $2,300*   *Cost varies based on your insurance coverage  For more information about hours, l